# Patient Record
Sex: MALE | Race: WHITE | Employment: FULL TIME | URBAN - METROPOLITAN AREA
[De-identification: names, ages, dates, MRNs, and addresses within clinical notes are randomized per-mention and may not be internally consistent; named-entity substitution may affect disease eponyms.]

---

## 2017-01-10 RX ORDER — ALBUTEROL SULFATE 90 UG/1
2 AEROSOL, METERED RESPIRATORY (INHALATION) EVERY 4 HOURS PRN
Qty: 1 INHALER | Refills: 6 | Status: SHIPPED | OUTPATIENT
Start: 2017-01-10 | End: 2017-05-25

## 2017-01-26 DIAGNOSIS — M51.26 DISPLACEMENT OF LUMBAR INTERVERTEBRAL DISC WITHOUT MYELOPATHY: Primary | ICD-10-CM

## 2017-01-26 RX ORDER — HYDROCODONE BITARTRATE AND ACETAMINOPHEN 7.5; 325 MG/1; MG/1
TABLET ORAL
Qty: 150 TABLET | Refills: 0 | Status: SHIPPED | OUTPATIENT
Start: 2017-01-26 | End: 2017-03-02

## 2017-03-02 DIAGNOSIS — M51.26 DISPLACEMENT OF LUMBAR INTERVERTEBRAL DISC WITHOUT MYELOPATHY: Primary | ICD-10-CM

## 2017-03-02 RX ORDER — HYDROCODONE BITARTRATE AND ACETAMINOPHEN 7.5; 325 MG/1; MG/1
TABLET ORAL
Qty: 150 TABLET | Refills: 0 | Status: SHIPPED | OUTPATIENT
Start: 2017-03-02 | End: 2017-04-03

## 2017-04-03 DIAGNOSIS — M51.26 DISPLACEMENT OF LUMBAR INTERVERTEBRAL DISC WITHOUT MYELOPATHY: Primary | ICD-10-CM

## 2017-04-03 RX ORDER — HYDROCODONE BITARTRATE AND ACETAMINOPHEN 7.5; 325 MG/1; MG/1
TABLET ORAL
Qty: 150 TABLET | Refills: 0 | Status: SHIPPED | OUTPATIENT
Start: 2017-04-03 | End: 2017-05-04

## 2017-04-17 ENCOUNTER — OFFICE VISIT (OUTPATIENT)
Dept: INTERNAL MEDICINE CLINIC | Facility: CLINIC | Age: 42
End: 2017-04-17

## 2017-04-17 VITALS
TEMPERATURE: 98 F | HEART RATE: 88 BPM | WEIGHT: 295 LBS | BODY MASS INDEX: 34.13 KG/M2 | HEIGHT: 78 IN | SYSTOLIC BLOOD PRESSURE: 112 MMHG | DIASTOLIC BLOOD PRESSURE: 70 MMHG

## 2017-04-17 DIAGNOSIS — E78.00 PURE HYPERCHOLESTEROLEMIA: ICD-10-CM

## 2017-04-17 DIAGNOSIS — M51.26 DISPLACEMENT OF LUMBAR INTERVERTEBRAL DISC WITHOUT MYELOPATHY: Primary | ICD-10-CM

## 2017-04-17 DIAGNOSIS — R73.9 HYPERGLYCEMIA: ICD-10-CM

## 2017-04-17 PROCEDURE — 99212 OFFICE O/P EST SF 10 MIN: CPT | Performed by: INTERNAL MEDICINE

## 2017-04-17 PROCEDURE — 99214 OFFICE O/P EST MOD 30 MIN: CPT | Performed by: INTERNAL MEDICINE

## 2017-04-17 RX ORDER — ALBUTEROL SULFATE 90 UG/1
2 AEROSOL, METERED RESPIRATORY (INHALATION) EVERY 4 HOURS PRN
Qty: 1 INHALER | Refills: 6 | Status: SHIPPED | OUTPATIENT
Start: 2017-04-17 | End: 2021-10-28

## 2017-04-17 NOTE — PROGRESS NOTES
HPI:    Patient ID: Manav Cameron is a 39year old male. Hyperlipidemia  This is a chronic problem. The current episode started more than 1 year ago. The problem is controlled.  Recent lipid tests were reviewed and are normal. Pertinent negatives inclu EXAM:   Physical Exam   Constitutional: He is oriented to person, place, and time. He appears well-developed and well-nourished. No distress.    HENT:   Right Ear: External ear normal.   Left Ear: External ear normal.   Nose: Nose normal.   Mouth/Throat: Or 4 (four) hours as needed for Wheezing.            Imaging & Referrals:  None       #[id number]

## 2017-04-22 ENCOUNTER — HOSPITAL (OUTPATIENT)
Dept: OTHER | Age: 42
End: 2017-04-22
Attending: EMERGENCY MEDICINE

## 2017-05-04 DIAGNOSIS — M51.26 DISPLACEMENT OF LUMBAR INTERVERTEBRAL DISC WITHOUT MYELOPATHY: Primary | ICD-10-CM

## 2017-05-04 RX ORDER — MONTELUKAST SODIUM 10 MG/1
10 TABLET ORAL NIGHTLY
Qty: 30 TABLET | Refills: 1 | Status: SHIPPED | OUTPATIENT
Start: 2017-05-04 | End: 2017-07-02

## 2017-05-04 RX ORDER — MONTELUKAST SODIUM 10 MG/1
10 TABLET ORAL NIGHTLY
Qty: 90 TABLET | Refills: 3 | Status: SHIPPED | OUTPATIENT
Start: 2017-05-04 | End: 2019-10-15

## 2017-05-04 RX ORDER — HYDROCODONE BITARTRATE AND ACETAMINOPHEN 7.5; 325 MG/1; MG/1
TABLET ORAL
Qty: 150 TABLET | Refills: 0 | Status: SHIPPED | OUTPATIENT
Start: 2017-05-04 | End: 2017-06-01

## 2017-06-01 DIAGNOSIS — M51.26 DISPLACEMENT OF LUMBAR INTERVERTEBRAL DISC WITHOUT MYELOPATHY: Primary | ICD-10-CM

## 2017-06-01 RX ORDER — HYDROCODONE BITARTRATE AND ACETAMINOPHEN 7.5; 325 MG/1; MG/1
TABLET ORAL
Qty: 150 TABLET | Refills: 0 | Status: SHIPPED | OUTPATIENT
Start: 2017-06-01 | End: 2017-06-26

## 2017-06-26 DIAGNOSIS — M51.26 DISPLACEMENT OF LUMBAR INTERVERTEBRAL DISC WITHOUT MYELOPATHY: ICD-10-CM

## 2017-06-26 RX ORDER — HYDROCODONE BITARTRATE AND ACETAMINOPHEN 7.5; 325 MG/1; MG/1
TABLET ORAL
Qty: 150 TABLET | Refills: 0 | Status: SHIPPED | OUTPATIENT
Start: 2017-06-28 | End: 2017-07-27

## 2017-07-06 RX ORDER — MONTELUKAST SODIUM 10 MG/1
TABLET ORAL
Qty: 30 TABLET | Refills: 0 | Status: SHIPPED | OUTPATIENT
Start: 2017-07-06 | End: 2017-08-10

## 2017-07-27 DIAGNOSIS — M51.26 DISPLACEMENT OF LUMBAR INTERVERTEBRAL DISC WITHOUT MYELOPATHY: ICD-10-CM

## 2017-07-27 RX ORDER — HYDROCODONE BITARTRATE AND ACETAMINOPHEN 7.5; 325 MG/1; MG/1
TABLET ORAL
Qty: 150 TABLET | Refills: 0 | Status: SHIPPED | OUTPATIENT
Start: 2017-07-27 | End: 2017-08-24

## 2017-08-04 ENCOUNTER — TELEPHONE (OUTPATIENT)
Dept: INTERNAL MEDICINE CLINIC | Facility: CLINIC | Age: 42
End: 2017-08-04

## 2017-08-04 RX ORDER — LEVOFLOXACIN 500 MG/1
500 TABLET, FILM COATED ORAL DAILY
Qty: 10 TABLET | Refills: 0 | Status: SHIPPED | OUTPATIENT
Start: 2017-08-04 | End: 2019-10-24

## 2017-08-10 RX ORDER — MONTELUKAST SODIUM 10 MG/1
TABLET ORAL
Qty: 30 TABLET | Refills: 11 | Status: SHIPPED | OUTPATIENT
Start: 2017-08-10 | End: 2017-10-09

## 2017-08-24 DIAGNOSIS — M51.26 DISPLACEMENT OF LUMBAR INTERVERTEBRAL DISC WITHOUT MYELOPATHY: ICD-10-CM

## 2017-08-24 RX ORDER — HYDROCODONE BITARTRATE AND ACETAMINOPHEN 7.5; 325 MG/1; MG/1
TABLET ORAL
Qty: 150 TABLET | Refills: 0 | Status: SHIPPED | OUTPATIENT
Start: 2017-08-24 | End: 2017-09-21

## 2017-09-21 DIAGNOSIS — M51.26 DISPLACEMENT OF LUMBAR INTERVERTEBRAL DISC WITHOUT MYELOPATHY: ICD-10-CM

## 2017-09-22 RX ORDER — HYDROCODONE BITARTRATE AND ACETAMINOPHEN 7.5; 325 MG/1; MG/1
TABLET ORAL
Qty: 150 TABLET | Refills: 0 | Status: SHIPPED | OUTPATIENT
Start: 2017-09-25 | End: 2017-10-20

## 2017-10-09 ENCOUNTER — OFFICE VISIT (OUTPATIENT)
Dept: INTERNAL MEDICINE CLINIC | Facility: CLINIC | Age: 42
End: 2017-10-09

## 2017-10-09 VITALS
WEIGHT: 299 LBS | HEIGHT: 78 IN | TEMPERATURE: 98 F | HEART RATE: 80 BPM | BODY MASS INDEX: 34.59 KG/M2 | SYSTOLIC BLOOD PRESSURE: 110 MMHG | DIASTOLIC BLOOD PRESSURE: 76 MMHG

## 2017-10-09 DIAGNOSIS — R73.9 HYPERGLYCEMIA: ICD-10-CM

## 2017-10-09 DIAGNOSIS — Z00.00 ANNUAL PHYSICAL EXAM: Primary | ICD-10-CM

## 2017-10-09 DIAGNOSIS — E78.00 PURE HYPERCHOLESTEROLEMIA: ICD-10-CM

## 2017-10-09 DIAGNOSIS — Z72.0 TOBACCO ABUSE: ICD-10-CM

## 2017-10-09 PROCEDURE — 99396 PREV VISIT EST AGE 40-64: CPT | Performed by: INTERNAL MEDICINE

## 2017-10-09 PROCEDURE — 36415 COLL VENOUS BLD VENIPUNCTURE: CPT | Performed by: INTERNAL MEDICINE

## 2017-10-09 NOTE — PROGRESS NOTES
HPI:    Patient ID: Ariana Butler is a 43year old male. Patient is stable, he has been trying to exercise more and watch his diet. Nicotine Dependence   This is a chronic problem. The current episode started more than 1 year ago.  The problem oc 4 (four) hours as needed for Wheezing.  Disp: 1 Inhaler Rfl: 6   triamcinolone acetonide 0.1 % External Ointment APPLY BID PRN TO AFFECTED AREA Disp: 30 g Rfl: 3   naproxen (NAPROSYN) 500 MG Oral Tab TAKE 1 TABLET TWICE A DAY WITH FOOD Disp: 180 tablet Rfl: hypercholesterolemia  Hyperglycemia  Tobacco abuse      Orders Placed This Encounter      CBC [6399] [Q]      COMP METABOLIC PANEL [84328] [Q]      HGB A1C [496] [Q]      LIPID PANEL [7600] [Q]      TSH [899] [Q]    Meds This Visit:  No prescriptions reque

## 2017-10-20 DIAGNOSIS — M51.26 DISPLACEMENT OF LUMBAR INTERVERTEBRAL DISC WITHOUT MYELOPATHY: ICD-10-CM

## 2017-10-20 RX ORDER — HYDROCODONE BITARTRATE AND ACETAMINOPHEN 7.5; 325 MG/1; MG/1
TABLET ORAL
Qty: 150 TABLET | Refills: 0 | Status: SHIPPED | OUTPATIENT
Start: 2017-10-23 | End: 2017-11-16

## 2017-11-16 DIAGNOSIS — M51.26 DISPLACEMENT OF LUMBAR INTERVERTEBRAL DISC WITHOUT MYELOPATHY: ICD-10-CM

## 2017-11-21 RX ORDER — HYDROCODONE BITARTRATE AND ACETAMINOPHEN 7.5; 325 MG/1; MG/1
TABLET ORAL
Qty: 150 TABLET | Refills: 0 | Status: SHIPPED | OUTPATIENT
Start: 2017-11-21 | End: 2017-12-18

## 2017-12-18 DIAGNOSIS — M51.26 DISPLACEMENT OF LUMBAR INTERVERTEBRAL DISC WITHOUT MYELOPATHY: ICD-10-CM

## 2017-12-18 RX ORDER — HYDROCODONE BITARTRATE AND ACETAMINOPHEN 7.5; 325 MG/1; MG/1
TABLET ORAL
Qty: 150 TABLET | Refills: 0 | Status: SHIPPED | OUTPATIENT
Start: 2017-12-19 | End: 2018-01-16

## 2018-01-16 DIAGNOSIS — M51.26 DISPLACEMENT OF LUMBAR INTERVERTEBRAL DISC WITHOUT MYELOPATHY: ICD-10-CM

## 2018-01-16 RX ORDER — HYDROCODONE BITARTRATE AND ACETAMINOPHEN 7.5; 325 MG/1; MG/1
TABLET ORAL
Qty: 150 TABLET | Refills: 0 | Status: SHIPPED | OUTPATIENT
Start: 2018-01-16 | End: 2018-02-08

## 2018-02-08 DIAGNOSIS — M51.26 DISPLACEMENT OF LUMBAR INTERVERTEBRAL DISC WITHOUT MYELOPATHY: ICD-10-CM

## 2018-02-08 RX ORDER — HYDROCODONE BITARTRATE AND ACETAMINOPHEN 7.5; 325 MG/1; MG/1
TABLET ORAL
Qty: 150 TABLET | Refills: 0 | Status: SHIPPED | OUTPATIENT
Start: 2018-02-14 | End: 2018-03-08

## 2018-03-08 ENCOUNTER — TELEPHONE (OUTPATIENT)
Dept: INTERNAL MEDICINE CLINIC | Facility: CLINIC | Age: 43
End: 2018-03-08

## 2018-03-08 DIAGNOSIS — M51.26 DISPLACEMENT OF LUMBAR INTERVERTEBRAL DISC WITHOUT MYELOPATHY: ICD-10-CM

## 2018-03-08 RX ORDER — HYDROCODONE BITARTRATE AND ACETAMINOPHEN 7.5; 325 MG/1; MG/1
TABLET ORAL
Qty: 150 TABLET | Refills: 0 | Status: SHIPPED | OUTPATIENT
Start: 2018-03-15 | End: 2018-04-09

## 2018-04-09 ENCOUNTER — OFFICE VISIT (OUTPATIENT)
Dept: INTERNAL MEDICINE CLINIC | Facility: CLINIC | Age: 43
End: 2018-04-09

## 2018-04-09 VITALS
DIASTOLIC BLOOD PRESSURE: 80 MMHG | WEIGHT: 302 LBS | BODY MASS INDEX: 34.94 KG/M2 | HEART RATE: 92 BPM | TEMPERATURE: 98 F | HEIGHT: 78 IN | SYSTOLIC BLOOD PRESSURE: 130 MMHG

## 2018-04-09 DIAGNOSIS — R73.9 HYPERGLYCEMIA: ICD-10-CM

## 2018-04-09 DIAGNOSIS — E78.00 PURE HYPERCHOLESTEROLEMIA: Primary | ICD-10-CM

## 2018-04-09 DIAGNOSIS — I10 ESSENTIAL HYPERTENSION, BENIGN: ICD-10-CM

## 2018-04-09 DIAGNOSIS — M51.26 DISPLACEMENT OF LUMBAR INTERVERTEBRAL DISC WITHOUT MYELOPATHY: ICD-10-CM

## 2018-04-09 PROCEDURE — 99212 OFFICE O/P EST SF 10 MIN: CPT | Performed by: INTERNAL MEDICINE

## 2018-04-09 PROCEDURE — 99214 OFFICE O/P EST MOD 30 MIN: CPT | Performed by: INTERNAL MEDICINE

## 2018-04-09 RX ORDER — HYDROCODONE BITARTRATE AND ACETAMINOPHEN 7.5; 325 MG/1; MG/1
TABLET ORAL
Qty: 150 TABLET | Refills: 0 | Status: SHIPPED | OUTPATIENT
Start: 2018-04-09 | End: 2018-05-03

## 2018-04-09 NOTE — PROGRESS NOTES
HPI:    Patient ID: Jae Holland is a 43year old male. Hypertension   This is a chronic problem. The current episode started more than 1 year ago. The problem is unchanged. The problem is controlled.  Pertinent negatives include no anxiety, blurred v Montelukast Sodium 10 MG Oral Tab Take 1 tablet (10 mg total) by mouth nightly.  Disp: 90 tablet Rfl: 3   Albuterol Sulfate HFA (PROAIR HFA) 108 (90 Base) MCG/ACT Inhalation Aero Soln Inhale 2 puffs into the lungs every 4 (four) hours as needed for [employer] pallor. Psychiatric: He has a normal mood and affect. His behavior is normal. Judgment and thought content normal.   Vitals reviewed.              ASSESSMENT/PLAN:   Essential hypertension, benign  bp Is controlled, diet and exercise , stop smoking all to

## 2018-04-09 NOTE — ASSESSMENT & PLAN NOTE
Discussed re evaluation  With neuro surgery , and updated MRI,   Patient will notify when he is ready , claustrophobic also.

## 2018-05-03 ENCOUNTER — TELEPHONE (OUTPATIENT)
Dept: FAMILY MEDICINE CLINIC | Facility: CLINIC | Age: 43
End: 2018-05-03

## 2018-05-03 DIAGNOSIS — M51.26 DISPLACEMENT OF LUMBAR INTERVERTEBRAL DISC WITHOUT MYELOPATHY: ICD-10-CM

## 2018-05-03 RX ORDER — HYDROCODONE BITARTRATE AND ACETAMINOPHEN 7.5; 325 MG/1; MG/1
TABLET ORAL
Qty: 150 TABLET | Refills: 0 | Status: SHIPPED | OUTPATIENT
Start: 2018-05-08 | End: 2018-06-01

## 2018-06-01 ENCOUNTER — TELEPHONE (OUTPATIENT)
Dept: FAMILY MEDICINE CLINIC | Facility: CLINIC | Age: 43
End: 2018-06-01

## 2018-06-01 DIAGNOSIS — M51.26 DISPLACEMENT OF LUMBAR INTERVERTEBRAL DISC WITHOUT MYELOPATHY: ICD-10-CM

## 2018-06-01 RX ORDER — HYDROCODONE BITARTRATE AND ACETAMINOPHEN 7.5; 325 MG/1; MG/1
TABLET ORAL
Qty: 150 TABLET | Refills: 0 | Status: SHIPPED | OUTPATIENT
Start: 2018-06-07 | End: 2018-06-28

## 2018-06-28 ENCOUNTER — TELEPHONE (OUTPATIENT)
Dept: FAMILY MEDICINE CLINIC | Facility: CLINIC | Age: 43
End: 2018-06-28

## 2018-06-28 DIAGNOSIS — M51.26 DISPLACEMENT OF LUMBAR INTERVERTEBRAL DISC WITHOUT MYELOPATHY: ICD-10-CM

## 2018-06-28 RX ORDER — HYDROCODONE BITARTRATE AND ACETAMINOPHEN 7.5; 325 MG/1; MG/1
TABLET ORAL
Qty: 150 TABLET | Refills: 0 | Status: SHIPPED | OUTPATIENT
Start: 2018-07-06 | End: 2018-08-02

## 2018-07-02 ENCOUNTER — OFFICE VISIT (OUTPATIENT)
Dept: INTERNAL MEDICINE CLINIC | Facility: CLINIC | Age: 43
End: 2018-07-02

## 2018-07-02 VITALS
BODY MASS INDEX: 35.17 KG/M2 | SYSTOLIC BLOOD PRESSURE: 130 MMHG | DIASTOLIC BLOOD PRESSURE: 80 MMHG | HEART RATE: 80 BPM | WEIGHT: 304 LBS | TEMPERATURE: 99 F | HEIGHT: 78 IN

## 2018-07-02 DIAGNOSIS — M10.072 ACUTE IDIOPATHIC GOUT OF LEFT ANKLE: Primary | ICD-10-CM

## 2018-07-02 PROCEDURE — 99214 OFFICE O/P EST MOD 30 MIN: CPT | Performed by: INTERNAL MEDICINE

## 2018-07-02 PROCEDURE — 99212 OFFICE O/P EST SF 10 MIN: CPT | Performed by: INTERNAL MEDICINE

## 2018-07-02 RX ORDER — INDOMETHACIN 50 MG/1
50 CAPSULE ORAL 2 TIMES DAILY WITH MEALS
Qty: 30 CAPSULE | Refills: 1 | Status: SHIPPED | OUTPATIENT
Start: 2018-07-02 | End: 2021-10-28 | Stop reason: ALTCHOICE

## 2018-07-02 NOTE — PROGRESS NOTES
HPI:    Patient ID: Gabriele Goel is a 43year old male. Pain   This is a new problem. The current episode started in the past 7 days. The problem has been unchanged. Associated symptoms include arthralgias and myalgias.  Pertinent negatives include no triamcinolone acetonide 0.1 % External Ointment APPLY BID PRN TO AFFECTED AREA Disp: 30 g Rfl: 3     Allergies:  Penicillins             ITCHING, FEVER   PHYSICAL EXAM:   Physical Exam   Constitutional: He is oriented to person, place, and time.  He appears indomethacin 50 MG Oral Cap 30 capsule 1      Sig: Take 1 capsule (50 mg total) by mouth 2 (two) times daily with meals.            Imaging & Referrals:  None       KF#[id number]

## 2018-07-03 ENCOUNTER — TELEPHONE (OUTPATIENT)
Dept: INTERNAL MEDICINE CLINIC | Facility: CLINIC | Age: 43
End: 2018-07-03

## 2018-07-03 LAB — URIC ACID: 8.8 MG/DL (ref 4–8)

## 2018-07-03 RX ORDER — ALLOPURINOL 100 MG/1
100 TABLET ORAL 2 TIMES DAILY
Qty: 60 TABLET | Refills: 0 | Status: SHIPPED | OUTPATIENT
Start: 2018-07-03 | End: 2020-03-31

## 2018-08-02 ENCOUNTER — TELEPHONE (OUTPATIENT)
Dept: FAMILY MEDICINE CLINIC | Facility: CLINIC | Age: 43
End: 2018-08-02

## 2018-08-02 DIAGNOSIS — M51.26 DISPLACEMENT OF LUMBAR INTERVERTEBRAL DISC WITHOUT MYELOPATHY: ICD-10-CM

## 2018-08-02 RX ORDER — HYDROCODONE BITARTRATE AND ACETAMINOPHEN 7.5; 325 MG/1; MG/1
TABLET ORAL
Qty: 150 TABLET | Refills: 0 | Status: SHIPPED | OUTPATIENT
Start: 2018-08-05 | End: 2018-08-30

## 2018-08-04 RX ORDER — MONTELUKAST SODIUM 10 MG/1
TABLET ORAL
Qty: 30 TABLET | Refills: 11 | Status: SHIPPED | OUTPATIENT
Start: 2018-08-04 | End: 2019-07-28

## 2018-08-30 ENCOUNTER — TELEPHONE (OUTPATIENT)
Dept: FAMILY MEDICINE CLINIC | Facility: CLINIC | Age: 43
End: 2018-08-30

## 2018-08-30 DIAGNOSIS — M51.26 DISPLACEMENT OF LUMBAR INTERVERTEBRAL DISC WITHOUT MYELOPATHY: ICD-10-CM

## 2018-08-30 RX ORDER — HYDROCODONE BITARTRATE AND ACETAMINOPHEN 7.5; 325 MG/1; MG/1
TABLET ORAL
Qty: 150 TABLET | Refills: 0 | Status: SHIPPED | OUTPATIENT
Start: 2018-09-04 | End: 2018-10-04

## 2018-10-04 ENCOUNTER — TELEPHONE (OUTPATIENT)
Dept: FAMILY MEDICINE CLINIC | Facility: CLINIC | Age: 43
End: 2018-10-04

## 2018-10-04 DIAGNOSIS — M51.26 DISPLACEMENT OF LUMBAR INTERVERTEBRAL DISC WITHOUT MYELOPATHY: ICD-10-CM

## 2018-10-04 RX ORDER — HYDROCODONE BITARTRATE AND ACETAMINOPHEN 7.5; 325 MG/1; MG/1
TABLET ORAL
Qty: 150 TABLET | Refills: 0 | Status: SHIPPED | OUTPATIENT
Start: 2018-10-04 | End: 2018-11-01

## 2018-11-01 ENCOUNTER — TELEPHONE (OUTPATIENT)
Dept: FAMILY MEDICINE CLINIC | Facility: CLINIC | Age: 43
End: 2018-11-01

## 2018-11-01 DIAGNOSIS — M51.26 DISPLACEMENT OF LUMBAR INTERVERTEBRAL DISC WITHOUT MYELOPATHY: ICD-10-CM

## 2018-11-01 RX ORDER — HYDROCODONE BITARTRATE AND ACETAMINOPHEN 7.5; 325 MG/1; MG/1
TABLET ORAL
Qty: 150 TABLET | Refills: 0 | Status: SHIPPED | OUTPATIENT
Start: 2018-11-03 | End: 2018-11-30

## 2018-11-02 ENCOUNTER — TELEPHONE (OUTPATIENT)
Dept: FAMILY MEDICINE CLINIC | Facility: CLINIC | Age: 43
End: 2018-11-02

## 2018-11-30 ENCOUNTER — TELEPHONE (OUTPATIENT)
Dept: FAMILY MEDICINE CLINIC | Facility: CLINIC | Age: 43
End: 2018-11-30

## 2018-11-30 DIAGNOSIS — M51.26 DISPLACEMENT OF LUMBAR INTERVERTEBRAL DISC WITHOUT MYELOPATHY: ICD-10-CM

## 2018-11-30 RX ORDER — HYDROCODONE BITARTRATE AND ACETAMINOPHEN 7.5; 325 MG/1; MG/1
TABLET ORAL
Qty: 150 TABLET | Refills: 0 | Status: SHIPPED | OUTPATIENT
Start: 2018-12-03 | End: 2019-01-03

## 2019-01-03 DIAGNOSIS — M51.26 DISPLACEMENT OF LUMBAR INTERVERTEBRAL DISC WITHOUT MYELOPATHY: ICD-10-CM

## 2019-01-03 RX ORDER — HYDROCODONE BITARTRATE AND ACETAMINOPHEN 7.5; 325 MG/1; MG/1
TABLET ORAL
Qty: 150 TABLET | Refills: 0 | Status: SHIPPED | OUTPATIENT
Start: 2019-01-03 | End: 2019-01-31

## 2019-01-08 ENCOUNTER — TELEPHONE (OUTPATIENT)
Dept: FAMILY MEDICINE CLINIC | Facility: CLINIC | Age: 44
End: 2019-01-08

## 2019-01-31 ENCOUNTER — TELEPHONE (OUTPATIENT)
Dept: FAMILY MEDICINE CLINIC | Facility: CLINIC | Age: 44
End: 2019-01-31

## 2019-01-31 DIAGNOSIS — M51.26 DISPLACEMENT OF LUMBAR INTERVERTEBRAL DISC WITHOUT MYELOPATHY: ICD-10-CM

## 2019-01-31 RX ORDER — HYDROCODONE BITARTRATE AND ACETAMINOPHEN 7.5; 325 MG/1; MG/1
TABLET ORAL
Qty: 150 TABLET | Refills: 0 | Status: SHIPPED | OUTPATIENT
Start: 2019-02-02 | End: 2019-02-28

## 2019-02-28 ENCOUNTER — TELEPHONE (OUTPATIENT)
Dept: FAMILY MEDICINE CLINIC | Facility: CLINIC | Age: 44
End: 2019-02-28

## 2019-02-28 DIAGNOSIS — M51.26 DISPLACEMENT OF LUMBAR INTERVERTEBRAL DISC WITHOUT MYELOPATHY: ICD-10-CM

## 2019-02-28 RX ORDER — HYDROCODONE BITARTRATE AND ACETAMINOPHEN 7.5; 325 MG/1; MG/1
TABLET ORAL
Qty: 150 TABLET | Refills: 0 | Status: SHIPPED | OUTPATIENT
Start: 2019-03-01 | End: 2019-04-01

## 2019-04-01 ENCOUNTER — TELEPHONE (OUTPATIENT)
Dept: FAMILY MEDICINE CLINIC | Facility: CLINIC | Age: 44
End: 2019-04-01

## 2019-04-01 DIAGNOSIS — M51.26 DISPLACEMENT OF LUMBAR INTERVERTEBRAL DISC WITHOUT MYELOPATHY: ICD-10-CM

## 2019-04-01 RX ORDER — HYDROCODONE BITARTRATE AND ACETAMINOPHEN 7.5; 325 MG/1; MG/1
TABLET ORAL
Qty: 150 TABLET | Refills: 0 | Status: SHIPPED | OUTPATIENT
Start: 2019-04-01 | End: 2019-04-25

## 2019-04-25 ENCOUNTER — TELEPHONE (OUTPATIENT)
Dept: FAMILY MEDICINE CLINIC | Facility: CLINIC | Age: 44
End: 2019-04-25

## 2019-04-25 DIAGNOSIS — M51.26 DISPLACEMENT OF LUMBAR INTERVERTEBRAL DISC WITHOUT MYELOPATHY: ICD-10-CM

## 2019-04-25 RX ORDER — HYDROCODONE BITARTRATE AND ACETAMINOPHEN 7.5; 325 MG/1; MG/1
TABLET ORAL
Qty: 150 TABLET | Refills: 0 | Status: SHIPPED | OUTPATIENT
Start: 2019-04-30 | End: 2019-05-28

## 2019-05-28 ENCOUNTER — TELEPHONE (OUTPATIENT)
Dept: FAMILY MEDICINE CLINIC | Facility: CLINIC | Age: 44
End: 2019-05-28

## 2019-05-28 DIAGNOSIS — M51.26 DISPLACEMENT OF LUMBAR INTERVERTEBRAL DISC WITHOUT MYELOPATHY: ICD-10-CM

## 2019-05-30 RX ORDER — HYDROCODONE BITARTRATE AND ACETAMINOPHEN 7.5; 325 MG/1; MG/1
TABLET ORAL
Qty: 150 TABLET | Refills: 0 | Status: SHIPPED | OUTPATIENT
Start: 2019-05-30 | End: 2019-06-25

## 2019-06-25 ENCOUNTER — TELEPHONE (OUTPATIENT)
Dept: FAMILY MEDICINE CLINIC | Facility: CLINIC | Age: 44
End: 2019-06-25

## 2019-06-25 DIAGNOSIS — M51.26 DISPLACEMENT OF LUMBAR INTERVERTEBRAL DISC WITHOUT MYELOPATHY: ICD-10-CM

## 2019-06-27 RX ORDER — HYDROCODONE BITARTRATE AND ACETAMINOPHEN 7.5; 325 MG/1; MG/1
TABLET ORAL
Qty: 150 TABLET | Refills: 0 | Status: SHIPPED | OUTPATIENT
Start: 2019-06-29 | End: 2019-07-11

## 2019-07-11 ENCOUNTER — TELEPHONE (OUTPATIENT)
Dept: FAMILY MEDICINE CLINIC | Facility: CLINIC | Age: 44
End: 2019-07-11

## 2019-07-11 DIAGNOSIS — M51.26 DISPLACEMENT OF LUMBAR INTERVERTEBRAL DISC WITHOUT MYELOPATHY: ICD-10-CM

## 2019-07-11 RX ORDER — HYDROCODONE BITARTRATE AND ACETAMINOPHEN 7.5; 325 MG/1; MG/1
TABLET ORAL
Qty: 150 TABLET | Refills: 0 | Status: SHIPPED | OUTPATIENT
Start: 2019-07-28 | End: 2019-08-08

## 2019-07-11 NOTE — TELEPHONE ENCOUNTER
Is asking if he could have his Norco, early, not due till end of month, so Gildardo can get for him ? ?

## 2019-07-28 RX ORDER — MONTELUKAST SODIUM 10 MG/1
TABLET ORAL
Qty: 30 TABLET | Refills: 5 | Status: SHIPPED | OUTPATIENT
Start: 2019-07-28 | End: 2020-02-03

## 2019-08-08 ENCOUNTER — TELEPHONE (OUTPATIENT)
Dept: FAMILY MEDICINE CLINIC | Facility: CLINIC | Age: 44
End: 2019-08-08

## 2019-08-08 DIAGNOSIS — M51.26 DISPLACEMENT OF LUMBAR INTERVERTEBRAL DISC WITHOUT MYELOPATHY: ICD-10-CM

## 2019-08-08 RX ORDER — HYDROCODONE BITARTRATE AND ACETAMINOPHEN 7.5; 325 MG/1; MG/1
TABLET ORAL
Qty: 150 TABLET | Refills: 0 | Status: SHIPPED | OUTPATIENT
Start: 2019-08-27 | End: 2019-09-05

## 2019-09-05 ENCOUNTER — TELEPHONE (OUTPATIENT)
Dept: INTERNAL MEDICINE CLINIC | Facility: CLINIC | Age: 44
End: 2019-09-05

## 2019-09-05 DIAGNOSIS — M51.26 DISPLACEMENT OF LUMBAR INTERVERTEBRAL DISC WITHOUT MYELOPATHY: ICD-10-CM

## 2019-09-05 RX ORDER — HYDROCODONE BITARTRATE AND ACETAMINOPHEN 7.5; 325 MG/1; MG/1
TABLET ORAL
Qty: 150 TABLET | Refills: 0 | Status: SHIPPED | OUTPATIENT
Start: 2019-09-26 | End: 2019-10-24

## 2019-10-03 ENCOUNTER — TELEPHONE (OUTPATIENT)
Dept: FAMILY MEDICINE CLINIC | Facility: CLINIC | Age: 44
End: 2019-10-03

## 2019-10-03 DIAGNOSIS — M51.26 DISPLACEMENT OF LUMBAR INTERVERTEBRAL DISC WITHOUT MYELOPATHY: ICD-10-CM

## 2019-10-03 NOTE — TELEPHONE ENCOUNTER
Patients refill is due 10/25/19 and can be sent to the pharmacy by the doctor.   He will call back when it is time for refill

## 2019-10-15 ENCOUNTER — LAB ENCOUNTER (OUTPATIENT)
Dept: LAB | Age: 44
End: 2019-10-15
Attending: INTERNAL MEDICINE
Payer: COMMERCIAL

## 2019-10-15 ENCOUNTER — OFFICE VISIT (OUTPATIENT)
Dept: INTERNAL MEDICINE CLINIC | Facility: CLINIC | Age: 44
End: 2019-10-15
Payer: COMMERCIAL

## 2019-10-15 VITALS
BODY MASS INDEX: 34.83 KG/M2 | HEART RATE: 88 BPM | HEIGHT: 78 IN | DIASTOLIC BLOOD PRESSURE: 80 MMHG | TEMPERATURE: 99 F | WEIGHT: 301 LBS | SYSTOLIC BLOOD PRESSURE: 124 MMHG

## 2019-10-15 DIAGNOSIS — Z00.00 ANNUAL PHYSICAL EXAM: Primary | ICD-10-CM

## 2019-10-15 DIAGNOSIS — M48.062 SPINAL STENOSIS OF LUMBAR REGION WITH NEUROGENIC CLAUDICATION: ICD-10-CM

## 2019-10-15 DIAGNOSIS — I10 ESSENTIAL HYPERTENSION, BENIGN: ICD-10-CM

## 2019-10-15 PROCEDURE — 36415 COLL VENOUS BLD VENIPUNCTURE: CPT

## 2019-10-15 PROCEDURE — 99396 PREV VISIT EST AGE 40-64: CPT | Performed by: INTERNAL MEDICINE

## 2019-10-15 RX ORDER — ALPRAZOLAM 0.5 MG/1
TABLET ORAL
Qty: 4 TABLET | Refills: 0 | Status: SHIPPED | OUTPATIENT
Start: 2019-10-15 | End: 2021-07-01

## 2019-10-15 NOTE — ASSESSMENT & PLAN NOTE
MRI of the lumbar spine , might need referral to neurosurgery , physical therapy and epidural steroids did not work in the past, patient has been on Orren Rides for over 10 years.   His symptoms are worsening now with bilateral leg pain with walking which improve

## 2019-10-15 NOTE — PROGRESS NOTES
HPI:    Patient ID: Scott Payan is a 40year old male. HPIpatient   Hypertension   This is a chronic problem. The current episode started more than 1 year ago. The problem is unchanged. The problem is controlled.  Pertinent negatives include no anxie MG Oral Tab, Take xanax 1/2 hour prior to MRI , may repeat times one., Disp: 4 tablet, Rfl: 0  HYDROcodone-acetaminophen 7.5-325 MG Oral Tab, Take one every 6 to 8 hours as needed for pain not to exceed 5 tablets in 24 hours, Disp: 150 tablet, Rfl: 0  HALIMA No murmur heard. Pulmonary/Chest: Effort normal and breath sounds normal. No respiratory distress. He has no wheezes. He has no rales. He exhibits no tenderness. Abdominal: Soft. Bowel sounds are normal. He exhibits no distension and no mass.  There is

## 2019-10-21 ENCOUNTER — TELEPHONE (OUTPATIENT)
Dept: FAMILY MEDICINE CLINIC | Facility: CLINIC | Age: 44
End: 2019-10-21

## 2019-10-21 RX ORDER — VALACYCLOVIR HYDROCHLORIDE 1 G/1
1 TABLET, FILM COATED ORAL EVERY 12 HOURS SCHEDULED
Qty: 4 TABLET | Refills: 3 | Status: SHIPPED | OUTPATIENT
Start: 2019-10-21 | End: 2019-10-28

## 2019-10-24 ENCOUNTER — TELEPHONE (OUTPATIENT)
Dept: FAMILY MEDICINE CLINIC | Facility: CLINIC | Age: 44
End: 2019-10-24

## 2019-10-24 DIAGNOSIS — M51.26 DISPLACEMENT OF LUMBAR INTERVERTEBRAL DISC WITHOUT MYELOPATHY: ICD-10-CM

## 2019-10-24 RX ORDER — LEVOFLOXACIN 500 MG/1
500 TABLET, FILM COATED ORAL DAILY
Qty: 10 TABLET | Refills: 0 | Status: SHIPPED | OUTPATIENT
Start: 2019-10-24 | End: 2019-11-03

## 2019-10-24 RX ORDER — HYDROCODONE BITARTRATE AND ACETAMINOPHEN 7.5; 325 MG/1; MG/1
TABLET ORAL
Qty: 150 TABLET | Refills: 0 | Status: SHIPPED | OUTPATIENT
Start: 2019-10-25 | End: 2019-11-25

## 2019-11-25 ENCOUNTER — TELEPHONE (OUTPATIENT)
Dept: FAMILY MEDICINE CLINIC | Facility: CLINIC | Age: 44
End: 2019-11-25

## 2019-11-25 DIAGNOSIS — M51.26 DISPLACEMENT OF LUMBAR INTERVERTEBRAL DISC WITHOUT MYELOPATHY: ICD-10-CM

## 2019-11-25 RX ORDER — HYDROCODONE BITARTRATE AND ACETAMINOPHEN 7.5; 325 MG/1; MG/1
TABLET ORAL
Qty: 150 TABLET | Refills: 0 | Status: SHIPPED | OUTPATIENT
Start: 2019-11-25 | End: 2020-01-02

## 2019-11-25 NOTE — TELEPHONE ENCOUNTER
Refill for Norco----is asking for a two month supply, cause he wont have insurance, I told him not sure on that, we will have to get back to him. ..

## 2020-01-02 ENCOUNTER — TELEPHONE (OUTPATIENT)
Dept: FAMILY MEDICINE CLINIC | Facility: CLINIC | Age: 45
End: 2020-01-02

## 2020-01-02 DIAGNOSIS — M51.26 DISPLACEMENT OF LUMBAR INTERVERTEBRAL DISC WITHOUT MYELOPATHY: ICD-10-CM

## 2020-01-02 RX ORDER — HYDROCODONE BITARTRATE AND ACETAMINOPHEN 7.5; 325 MG/1; MG/1
TABLET ORAL
Qty: 150 TABLET | Refills: 0 | Status: SHIPPED | OUTPATIENT
Start: 2020-01-02 | End: 2020-01-28

## 2020-01-06 ENCOUNTER — TELEPHONE (OUTPATIENT)
Dept: FAMILY MEDICINE CLINIC | Facility: CLINIC | Age: 45
End: 2020-01-06

## 2020-01-06 NOTE — TELEPHONE ENCOUNTER
Prior authorization for Hydrocodone-Acetaminophen oral tab completed w/ [employer]  on cover my meds Key: [id number], turn around time 3-5 days.

## 2020-01-06 NOTE — TELEPHONE ENCOUNTER
Go to [employer]  Key:[id number]  Current Outpatient Medications:   •  HYDROcodone-acetaminophen 7.5-325 MG Oral Tab, Take one every 6 to 8 hours as needed for pain not to exceed 5 tablets in 24 hours, Disp: 150 tablet, Rfl: 0

## 2020-01-09 NOTE — TELEPHONE ENCOUNTER
Outcome   Approvedon January 6   CaseId:[id number];Status:Approved; Review Type:Prior Auth; Coverage Start Date: 01/06/2020; Coverage End Date: 01/05/2021;   Drug HYDROcodone-Acetaminophen 7.5-325MG tablets    VM has been left on pharmacy VM.

## 2020-01-19 ENCOUNTER — HOSPITAL (OUTPATIENT)
Dept: OTHER | Age: 45
End: 2020-01-19
Attending: INTERNAL MEDICINE

## 2020-01-19 ENCOUNTER — HOSPITAL (OUTPATIENT)
Dept: OTHER | Age: 45
End: 2020-01-19
Attending: HOSPITALIST

## 2020-01-19 LAB
A/G RATIO_: 1.6
ABS LYMPH: 2.2 K/CUMM (ref 1–3.5)
ABS MONO: 1 K/CUMM (ref 0.1–0.8)
ABS NEUTRO: 9 K/CUMM (ref 2–8)
ALBUMIN: 3.6 G/DL (ref 3.5–5)
ALK PHOS: 74 UNIT/L (ref 50–124)
ALT/GPT: 10 UNIT/L (ref 0–55)
ANION GAP SERPL CALC-SCNC: 11 MEQ/L (ref 10–20)
APTT PPP: 31 SECONDS (ref 22–30)
AST/GOT: 15 UNIT/L (ref 5–34)
BASOPHIL: 1 % (ref 0–1)
BILI TOTAL: 0.5 MG/DL (ref 0.2–1)
BUN SERPL-MCNC: 13 MG/DL (ref 6–20)
CALCIUM: 7.8 MG/DL (ref 8.4–10.2)
CHLORIDE: 112 MEQ/L (ref 97–107)
CREATININE: 0.64 MG/DL (ref 0.6–1.3)
DIFF_TYPE?: ABNORMAL
EOSINOPHIL: 1 % (ref 0–6)
GLOBULIN_: 2.2 G/DL (ref 2–4.1)
GLUCOSE LVL: 90 MG/DL (ref 70–99)
HCT VFR BLD CALC: 41 % (ref 36–51)
HEMOLYSIS 2+: ABNORMAL
HGB A1C: 5.1 %
HGB BLD-MCNC: 13.7 G/DL (ref 12–17)
IMMATURE GRAN: 0.2 % (ref 0–0.3)
INR: 0.9 (ref 0.9–1.1)
INSTR WBC: 12.5 K/CUMM (ref 4–11)
LIPEMIC 3+: NEGATIVE
LYMPHOCYTE: 18 %
MCH RBC QN AUTO: 29 PG (ref 25–35)
MCHC RBC AUTO-ENTMCNC: 33 G/DL (ref 32–37)
MCV RBC AUTO: 87 FL (ref 78–97)
MONOCYTE: 8 %
NEUTROPHIL: 72 %
NRBC BLD MANUAL-RTO: 0 % (ref 0–0.2)
PLATELET: 312 K/CUMM (ref 150–450)
POTASSIUM: 3.9 MEQ/L (ref 3.5–5.1)
PROTHROMBIN TIME: 9.7 SECONDS (ref 9.7–11.6)
RBC # BLD: 4.72 M/CUMM (ref 4.2–6)
RDW: 12.5 % (ref 11.5–14.5)
SODIUM: 141 MEQ/L (ref 136–145)
TCO2: 22 MEQ/L (ref 19–29)
TOTAL PROTEIN: 5.8 G/DL (ref 6.4–8.3)
WBC # BLD: 12.5 K/CUMM (ref 4–11)

## 2020-01-19 PROCEDURE — 99223 1ST HOSP IP/OBS HIGH 75: CPT | Performed by: HOSPITALIST

## 2020-01-20 LAB
ABS LYMPH: 1.8 K/CUMM (ref 1–3.5)
ABS MONO: 0.6 K/CUMM (ref 0.1–0.8)
ABS NEUTRO: 4.8 K/CUMM (ref 2–8)
ANION GAP SERPL CALC-SCNC: 11 MEQ/L (ref 10–20)
BASOPHIL: 1 % (ref 0–1)
BUN SERPL-MCNC: 15 MG/DL (ref 6–20)
CALCIUM: 8.9 MG/DL (ref 8.4–10.2)
CHLORIDE: 110 MEQ/L (ref 97–107)
CREATININE: 0.74 MG/DL (ref 0.6–1.3)
DIFF_TYPE?: NORMAL
EOSINOPHIL: 2 % (ref 0–6)
GLUCOSE LVL: 94 MG/DL (ref 70–99)
HCT VFR BLD CALC: 39 % (ref 36–51)
HEMOLYSIS 2+: NEGATIVE
HEMOLYSIS 2+: NEGATIVE
HGB BLD-MCNC: 12.9 G/DL (ref 12–17)
IMMATURE GRAN: 0.1 % (ref 0–0.3)
INSTR WBC: 7.5 K/CUMM (ref 4–11)
LYMPHOCYTE: 24 %
MAGNESIUM LEVEL: 2.1 MG/DL (ref 1.6–2.6)
MCH RBC QN AUTO: 29 PG (ref 25–35)
MCHC RBC AUTO-ENTMCNC: 33 G/DL (ref 32–37)
MCV RBC AUTO: 89 FL (ref 78–97)
MONOCYTE: 8 %
NEUTROPHIL: 65 %
NRBC BLD MANUAL-RTO: 0 % (ref 0–0.2)
PLATELET: 259 K/CUMM (ref 150–450)
POTASSIUM: 3.9 MEQ/L (ref 3.5–5.1)
RBC # BLD: 4.42 M/CUMM (ref 4.2–6)
RDW: 12.7 % (ref 11.5–14.5)
SODIUM: 142 MEQ/L (ref 136–145)
TCO2: 25 MEQ/L (ref 19–29)
WBC # BLD: 7.5 K/CUMM (ref 4–11)

## 2020-01-20 PROCEDURE — 99239 HOSP IP/OBS DSCHRG MGMT >30: CPT | Performed by: HOSPITALIST

## 2020-01-28 ENCOUNTER — TELEPHONE (OUTPATIENT)
Dept: FAMILY MEDICINE CLINIC | Facility: CLINIC | Age: 45
End: 2020-01-28

## 2020-01-28 DIAGNOSIS — M51.26 DISPLACEMENT OF LUMBAR INTERVERTEBRAL DISC WITHOUT MYELOPATHY: ICD-10-CM

## 2020-01-30 RX ORDER — HYDROCODONE BITARTRATE AND ACETAMINOPHEN 7.5; 325 MG/1; MG/1
TABLET ORAL
Qty: 150 TABLET | Refills: 0 | Status: SHIPPED | OUTPATIENT
Start: 2020-02-01 | End: 2020-03-05

## 2020-02-03 RX ORDER — MONTELUKAST SODIUM 10 MG/1
TABLET ORAL
Qty: 30 TABLET | Refills: 5 | Status: SHIPPED | OUTPATIENT
Start: 2020-02-03

## 2020-03-05 ENCOUNTER — TELEPHONE (OUTPATIENT)
Dept: FAMILY MEDICINE CLINIC | Facility: CLINIC | Age: 45
End: 2020-03-05

## 2020-03-05 DIAGNOSIS — M51.26 DISPLACEMENT OF LUMBAR INTERVERTEBRAL DISC WITHOUT MYELOPATHY: ICD-10-CM

## 2020-03-05 RX ORDER — HYDROCODONE BITARTRATE AND ACETAMINOPHEN 7.5; 325 MG/1; MG/1
TABLET ORAL
Qty: 150 TABLET | Refills: 0 | Status: SHIPPED | OUTPATIENT
Start: 2020-03-05 | End: 2020-03-31

## 2020-03-31 ENCOUNTER — TELEPHONE (OUTPATIENT)
Dept: FAMILY MEDICINE CLINIC | Facility: CLINIC | Age: 45
End: 2020-03-31

## 2020-03-31 DIAGNOSIS — M51.26 DISPLACEMENT OF LUMBAR INTERVERTEBRAL DISC WITHOUT MYELOPATHY: ICD-10-CM

## 2020-04-02 RX ORDER — ALLOPURINOL 100 MG/1
100 TABLET ORAL 2 TIMES DAILY
Qty: 60 TABLET | Refills: 11 | Status: SHIPPED | OUTPATIENT
Start: 2020-04-02

## 2020-04-02 RX ORDER — HYDROCODONE BITARTRATE AND ACETAMINOPHEN 7.5; 325 MG/1; MG/1
TABLET ORAL
Qty: 150 TABLET | Refills: 0 | Status: SHIPPED | OUTPATIENT
Start: 2020-04-04 | End: 2020-04-16

## 2020-04-16 ENCOUNTER — TELEPHONE (OUTPATIENT)
Dept: FAMILY MEDICINE CLINIC | Facility: CLINIC | Age: 45
End: 2020-04-16

## 2020-04-16 DIAGNOSIS — M51.26 DISPLACEMENT OF LUMBAR INTERVERTEBRAL DISC WITHOUT MYELOPATHY: ICD-10-CM

## 2020-04-16 RX ORDER — HYDROCODONE BITARTRATE AND ACETAMINOPHEN 7.5; 325 MG/1; MG/1
TABLET ORAL
Qty: 150 TABLET | Refills: 0 | Status: SHIPPED | OUTPATIENT
Start: 2020-04-16 | End: 2020-04-23

## 2020-04-16 NOTE — TELEPHONE ENCOUNTER
I called his pharmacy to get information for a prior authorization since he only received 7 day supply on last prescription. Information was received but stated I had to send a new prescription refill to do it.   Refill entered and routed to Dr. Zahra Rangel

## 2020-04-16 NOTE — TELEPHONE ENCOUNTER
Refill---NORCO---  I stated that his insurance is only paying for 7 days, when he picks up he will talk to pharmacist.

## 2020-04-23 ENCOUNTER — TELEPHONE (OUTPATIENT)
Dept: FAMILY MEDICINE CLINIC | Facility: CLINIC | Age: 45
End: 2020-04-23

## 2020-04-23 DIAGNOSIS — M51.26 DISPLACEMENT OF LUMBAR INTERVERTEBRAL DISC WITHOUT MYELOPATHY: ICD-10-CM

## 2020-04-23 RX ORDER — HYDROCODONE BITARTRATE AND ACETAMINOPHEN 7.5; 325 MG/1; MG/1
TABLET ORAL
Qty: 150 TABLET | Refills: 0 | Status: SHIPPED | OUTPATIENT
Start: 2020-04-23 | End: 2020-05-26

## 2020-04-23 NOTE — TELEPHONE ENCOUNTER
Refill----Norco      Patient states he just gets 7 pills,told him their was a prior auth done, that may be all his insurance is paying.

## 2020-05-22 ENCOUNTER — TELEPHONE (OUTPATIENT)
Dept: FAMILY MEDICINE CLINIC | Facility: CLINIC | Age: 45
End: 2020-05-22

## 2020-05-22 DIAGNOSIS — M51.26 DISPLACEMENT OF LUMBAR INTERVERTEBRAL DISC WITHOUT MYELOPATHY: ICD-10-CM

## 2020-05-26 RX ORDER — HYDROCODONE BITARTRATE AND ACETAMINOPHEN 7.5; 325 MG/1; MG/1
TABLET ORAL
Qty: 150 TABLET | Refills: 0 | Status: SHIPPED | OUTPATIENT
Start: 2020-05-26 | End: 2020-06-25

## 2020-06-25 ENCOUNTER — TELEPHONE (OUTPATIENT)
Dept: FAMILY MEDICINE CLINIC | Facility: CLINIC | Age: 45
End: 2020-06-25

## 2020-06-25 DIAGNOSIS — M51.26 DISPLACEMENT OF LUMBAR INTERVERTEBRAL DISC WITHOUT MYELOPATHY: ICD-10-CM

## 2020-06-25 RX ORDER — HYDROCODONE BITARTRATE AND ACETAMINOPHEN 7.5; 325 MG/1; MG/1
TABLET ORAL
Qty: 150 TABLET | Refills: 0 | Status: SHIPPED | OUTPATIENT
Start: 2020-06-25 | End: 2020-07-24

## 2020-07-24 ENCOUNTER — TELEPHONE (OUTPATIENT)
Dept: FAMILY MEDICINE CLINIC | Facility: CLINIC | Age: 45
End: 2020-07-24

## 2020-07-24 DIAGNOSIS — M51.26 DISPLACEMENT OF LUMBAR INTERVERTEBRAL DISC WITHOUT MYELOPATHY: ICD-10-CM

## 2020-07-24 RX ORDER — HYDROCODONE BITARTRATE AND ACETAMINOPHEN 7.5; 325 MG/1; MG/1
TABLET ORAL
Qty: 150 TABLET | Refills: 0 | Status: SHIPPED | OUTPATIENT
Start: 2020-07-24 | End: 2020-08-24

## 2020-08-24 ENCOUNTER — TELEPHONE (OUTPATIENT)
Dept: INTERNAL MEDICINE CLINIC | Facility: CLINIC | Age: 45
End: 2020-08-24

## 2020-08-24 DIAGNOSIS — M51.26 DISPLACEMENT OF LUMBAR INTERVERTEBRAL DISC WITHOUT MYELOPATHY: ICD-10-CM

## 2020-08-24 RX ORDER — HYDROCODONE BITARTRATE AND ACETAMINOPHEN 7.5; 325 MG/1; MG/1
TABLET ORAL
Qty: 150 TABLET | Refills: 0 | Status: SHIPPED | OUTPATIENT
Start: 2020-08-24 | End: 2020-09-24

## 2020-09-24 ENCOUNTER — TELEPHONE (OUTPATIENT)
Dept: INTERNAL MEDICINE CLINIC | Facility: CLINIC | Age: 45
End: 2020-09-24

## 2020-09-24 DIAGNOSIS — M51.26 DISPLACEMENT OF LUMBAR INTERVERTEBRAL DISC WITHOUT MYELOPATHY: ICD-10-CM

## 2020-09-24 RX ORDER — HYDROCODONE BITARTRATE AND ACETAMINOPHEN 7.5; 325 MG/1; MG/1
TABLET ORAL
Qty: 150 TABLET | Refills: 0 | Status: SHIPPED | OUTPATIENT
Start: 2020-09-24 | End: 2020-10-20

## 2020-09-30 RX ORDER — ALBUTEROL SULFATE 90 UG/1
AEROSOL, METERED RESPIRATORY (INHALATION)
Qty: 18 G | Refills: 0 | Status: SHIPPED | OUTPATIENT
Start: 2020-09-30 | End: 2020-10-19

## 2020-10-19 RX ORDER — ALBUTEROL SULFATE 90 UG/1
2 AEROSOL, METERED RESPIRATORY (INHALATION) EVERY 4 HOURS PRN
Qty: 18 G | Refills: 11 | Status: SHIPPED | OUTPATIENT
Start: 2020-10-19

## 2020-10-20 DIAGNOSIS — M51.26 DISPLACEMENT OF LUMBAR INTERVERTEBRAL DISC WITHOUT MYELOPATHY: ICD-10-CM

## 2020-10-20 RX ORDER — VALACYCLOVIR HYDROCHLORIDE 1 G/1
1 TABLET, FILM COATED ORAL EVERY 12 HOURS SCHEDULED
COMMUNITY
End: 2020-10-20

## 2020-10-20 RX ORDER — HYDROCODONE BITARTRATE AND ACETAMINOPHEN 7.5; 325 MG/1; MG/1
TABLET ORAL
Qty: 150 TABLET | Refills: 0 | Status: SHIPPED | OUTPATIENT
Start: 2020-10-23 | End: 2020-11-20

## 2020-10-20 RX ORDER — VALACYCLOVIR HYDROCHLORIDE 1 G/1
1 TABLET, FILM COATED ORAL EVERY 12 HOURS SCHEDULED
Qty: 21 TABLET | Refills: 3 | Status: SHIPPED | OUTPATIENT
Start: 2020-10-20 | End: 2021-05-04

## 2020-11-20 ENCOUNTER — TELEPHONE (OUTPATIENT)
Dept: INTERNAL MEDICINE CLINIC | Facility: CLINIC | Age: 45
End: 2020-11-20

## 2020-11-20 DIAGNOSIS — M51.26 DISPLACEMENT OF LUMBAR INTERVERTEBRAL DISC WITHOUT MYELOPATHY: ICD-10-CM

## 2020-11-20 RX ORDER — HYDROCODONE BITARTRATE AND ACETAMINOPHEN 7.5; 325 MG/1; MG/1
TABLET ORAL
Qty: 150 TABLET | Refills: 0 | Status: SHIPPED | OUTPATIENT
Start: 2020-11-22 | End: 2020-12-18

## 2020-12-18 ENCOUNTER — TELEPHONE (OUTPATIENT)
Dept: INTERNAL MEDICINE CLINIC | Facility: CLINIC | Age: 45
End: 2020-12-18

## 2020-12-18 DIAGNOSIS — M51.26 DISPLACEMENT OF LUMBAR INTERVERTEBRAL DISC WITHOUT MYELOPATHY: ICD-10-CM

## 2020-12-18 RX ORDER — HYDROCODONE BITARTRATE AND ACETAMINOPHEN 7.5; 325 MG/1; MG/1
TABLET ORAL
Qty: 150 TABLET | Refills: 0 | Status: SHIPPED | OUTPATIENT
Start: 2020-12-21 | End: 2021-01-21

## 2021-01-21 ENCOUNTER — TELEPHONE (OUTPATIENT)
Dept: INTERNAL MEDICINE CLINIC | Facility: CLINIC | Age: 46
End: 2021-01-21

## 2021-01-21 DIAGNOSIS — M51.26 DISPLACEMENT OF LUMBAR INTERVERTEBRAL DISC WITHOUT MYELOPATHY: ICD-10-CM

## 2021-01-21 RX ORDER — HYDROCODONE BITARTRATE AND ACETAMINOPHEN 7.5; 325 MG/1; MG/1
TABLET ORAL
Qty: 150 TABLET | Refills: 0 | Status: SHIPPED | OUTPATIENT
Start: 2021-01-21 | End: 2021-02-18

## 2021-02-18 ENCOUNTER — TELEPHONE (OUTPATIENT)
Dept: INTERNAL MEDICINE CLINIC | Facility: CLINIC | Age: 46
End: 2021-02-18

## 2021-02-18 DIAGNOSIS — M51.26 DISPLACEMENT OF LUMBAR INTERVERTEBRAL DISC WITHOUT MYELOPATHY: ICD-10-CM

## 2021-02-18 RX ORDER — HYDROCODONE BITARTRATE AND ACETAMINOPHEN 7.5; 325 MG/1; MG/1
TABLET ORAL
Qty: 150 TABLET | Refills: 0 | Status: SHIPPED | OUTPATIENT
Start: 2021-02-20 | End: 2021-03-22

## 2021-03-22 ENCOUNTER — TELEPHONE (OUTPATIENT)
Dept: INTERNAL MEDICINE CLINIC | Facility: CLINIC | Age: 46
End: 2021-03-22

## 2021-03-22 DIAGNOSIS — M51.26 DISPLACEMENT OF LUMBAR INTERVERTEBRAL DISC WITHOUT MYELOPATHY: ICD-10-CM

## 2021-03-22 RX ORDER — HYDROCODONE BITARTRATE AND ACETAMINOPHEN 7.5; 325 MG/1; MG/1
TABLET ORAL
Qty: 150 TABLET | Refills: 0 | Status: SHIPPED | OUTPATIENT
Start: 2021-03-22 | End: 2021-04-20

## 2021-04-20 ENCOUNTER — TELEPHONE (OUTPATIENT)
Dept: INTERNAL MEDICINE CLINIC | Facility: CLINIC | Age: 46
End: 2021-04-20

## 2021-04-20 DIAGNOSIS — M51.26 DISPLACEMENT OF LUMBAR INTERVERTEBRAL DISC WITHOUT MYELOPATHY: ICD-10-CM

## 2021-04-20 RX ORDER — HYDROCODONE BITARTRATE AND ACETAMINOPHEN 7.5; 325 MG/1; MG/1
TABLET ORAL
Qty: 150 TABLET | Refills: 0 | Status: SHIPPED | OUTPATIENT
Start: 2021-04-21 | End: 2021-05-18

## 2021-05-04 ENCOUNTER — TELEPHONE (OUTPATIENT)
Dept: INTERNAL MEDICINE CLINIC | Facility: CLINIC | Age: 46
End: 2021-05-04

## 2021-05-04 RX ORDER — VALACYCLOVIR HYDROCHLORIDE 1 G/1
1 TABLET, FILM COATED ORAL EVERY 12 HOURS SCHEDULED
Qty: 21 TABLET | Refills: 3 | Status: SHIPPED | OUTPATIENT
Start: 2021-05-04

## 2021-05-18 ENCOUNTER — TELEPHONE (OUTPATIENT)
Dept: INTERNAL MEDICINE CLINIC | Facility: CLINIC | Age: 46
End: 2021-05-18

## 2021-05-18 DIAGNOSIS — M51.26 DISPLACEMENT OF LUMBAR INTERVERTEBRAL DISC WITHOUT MYELOPATHY: ICD-10-CM

## 2021-05-18 RX ORDER — HYDROCODONE BITARTRATE AND ACETAMINOPHEN 7.5; 325 MG/1; MG/1
TABLET ORAL
Qty: 150 TABLET | Refills: 0 | Status: SHIPPED | OUTPATIENT
Start: 2021-05-20 | End: 2021-06-15

## 2021-06-15 ENCOUNTER — TELEPHONE (OUTPATIENT)
Dept: INTERNAL MEDICINE CLINIC | Facility: CLINIC | Age: 46
End: 2021-06-15

## 2021-06-15 DIAGNOSIS — M51.26 DISPLACEMENT OF LUMBAR INTERVERTEBRAL DISC WITHOUT MYELOPATHY: ICD-10-CM

## 2021-06-15 RX ORDER — HYDROCODONE BITARTRATE AND ACETAMINOPHEN 7.5; 325 MG/1; MG/1
TABLET ORAL
Qty: 150 TABLET | Refills: 0 | Status: SHIPPED | OUTPATIENT
Start: 2021-06-19 | End: 2021-07-16

## 2021-06-28 ENCOUNTER — TELEPHONE (OUTPATIENT)
Dept: INTERNAL MEDICINE CLINIC | Facility: CLINIC | Age: 46
End: 2021-06-28

## 2021-07-01 ENCOUNTER — OFFICE VISIT (OUTPATIENT)
Dept: INTERNAL MEDICINE CLINIC | Facility: CLINIC | Age: 46
End: 2021-07-01
Payer: COMMERCIAL

## 2021-07-01 VITALS
DIASTOLIC BLOOD PRESSURE: 80 MMHG | WEIGHT: 304 LBS | BODY MASS INDEX: 35.17 KG/M2 | HEIGHT: 78 IN | HEART RATE: 84 BPM | TEMPERATURE: 98 F | SYSTOLIC BLOOD PRESSURE: 126 MMHG

## 2021-07-01 DIAGNOSIS — Z00.00 ANNUAL PHYSICAL EXAM: Primary | ICD-10-CM

## 2021-07-01 DIAGNOSIS — I10 ESSENTIAL HYPERTENSION, BENIGN: ICD-10-CM

## 2021-07-01 DIAGNOSIS — Z12.11 COLON CANCER SCREENING: ICD-10-CM

## 2021-07-01 DIAGNOSIS — M25.551 RIGHT HIP PAIN: ICD-10-CM

## 2021-07-01 DIAGNOSIS — Z72.0 TOBACCO ABUSE: ICD-10-CM

## 2021-07-01 DIAGNOSIS — M48.062 SPINAL STENOSIS OF LUMBAR REGION WITH NEUROGENIC CLAUDICATION: ICD-10-CM

## 2021-07-01 PROCEDURE — 3008F BODY MASS INDEX DOCD: CPT | Performed by: INTERNAL MEDICINE

## 2021-07-01 PROCEDURE — 3074F SYST BP LT 130 MM HG: CPT | Performed by: INTERNAL MEDICINE

## 2021-07-01 PROCEDURE — 3079F DIAST BP 80-89 MM HG: CPT | Performed by: INTERNAL MEDICINE

## 2021-07-01 PROCEDURE — 99396 PREV VISIT EST AGE 40-64: CPT | Performed by: INTERNAL MEDICINE

## 2021-07-01 RX ORDER — VARENICLINE TARTRATE 0.5 (11)-1
0.5 KIT ORAL 2 TIMES DAILY
Qty: 1 EACH | Refills: 0 | Status: SHIPPED | OUTPATIENT
Start: 2021-07-01 | End: 2021-07-31

## 2021-07-01 RX ORDER — SILDENAFIL 100 MG/1
100 TABLET, FILM COATED ORAL AS NEEDED
Qty: 24 TABLET | Refills: 3 | Status: SHIPPED | OUTPATIENT
Start: 2021-07-01

## 2021-07-01 NOTE — PROGRESS NOTES
HPI:    Patient ID: Maxime Pope is a 39year old male. HPIpatient is stable, has issues with ED lately , not a full erection. Has not quit smoking, would like to try chantix again .  No chest pain , no shortness of breath , no change sin back pain External Ointment APPLY EXTERNALLY TO THE AFFECTED AREA TWICE DAILY AS NEEDED 30 g 3   • MONTELUKAST SODIUM 10 MG Oral Tab TAKE 1 TABLET(10 MG) BY MOUTH EVERY NIGHT (Patient not taking: Reported on 7/1/2021) 30 tablet 5   • indomethacin 50 MG Oral Cap Take General: No tenderness. Cervical back: Normal range of motion and neck supple. Lymphadenopathy:      Cervical: No cervical adenopathy. Skin:     General: Skin is warm and dry. Coloration: Skin is not pale. Findings: No erythema or rash.

## 2021-07-16 ENCOUNTER — TELEPHONE (OUTPATIENT)
Dept: INTERNAL MEDICINE CLINIC | Facility: CLINIC | Age: 46
End: 2021-07-16

## 2021-07-16 DIAGNOSIS — M51.26 DISPLACEMENT OF LUMBAR INTERVERTEBRAL DISC WITHOUT MYELOPATHY: ICD-10-CM

## 2021-07-16 RX ORDER — HYDROCODONE BITARTRATE AND ACETAMINOPHEN 7.5; 325 MG/1; MG/1
TABLET ORAL
Qty: 150 TABLET | Refills: 0 | Status: SHIPPED | OUTPATIENT
Start: 2021-07-18 | End: 2021-08-16

## 2021-08-16 ENCOUNTER — TELEPHONE (OUTPATIENT)
Dept: INTERNAL MEDICINE CLINIC | Facility: CLINIC | Age: 46
End: 2021-08-16

## 2021-08-16 DIAGNOSIS — M51.26 DISPLACEMENT OF LUMBAR INTERVERTEBRAL DISC WITHOUT MYELOPATHY: ICD-10-CM

## 2021-08-16 RX ORDER — HYDROCODONE BITARTRATE AND ACETAMINOPHEN 7.5; 325 MG/1; MG/1
TABLET ORAL
Qty: 150 TABLET | Refills: 0 | Status: SHIPPED | OUTPATIENT
Start: 2021-08-17 | End: 2021-09-16

## 2021-09-16 ENCOUNTER — TELEPHONE (OUTPATIENT)
Dept: INTERNAL MEDICINE CLINIC | Facility: CLINIC | Age: 46
End: 2021-09-16

## 2021-09-16 DIAGNOSIS — M51.26 DISPLACEMENT OF LUMBAR INTERVERTEBRAL DISC WITHOUT MYELOPATHY: ICD-10-CM

## 2021-09-16 RX ORDER — HYDROCODONE BITARTRATE AND ACETAMINOPHEN 7.5; 325 MG/1; MG/1
TABLET ORAL
Qty: 150 TABLET | Refills: 0 | Status: SHIPPED | OUTPATIENT
Start: 2021-09-16 | End: 2021-09-23

## 2021-09-20 ENCOUNTER — TELEPHONE (OUTPATIENT)
Dept: INTERNAL MEDICINE CLINIC | Facility: CLINIC | Age: 46
End: 2021-09-20

## 2021-09-20 NOTE — TELEPHONE ENCOUNTER
Patient was notified of approved prior authorization for his Norco.  He had already picked up a 7 day supply which was waiting for  at the pharmacy. He stated he would call back next week for a new prescription.

## 2021-09-23 ENCOUNTER — TELEPHONE (OUTPATIENT)
Dept: INTERNAL MEDICINE CLINIC | Facility: CLINIC | Age: 46
End: 2021-09-23

## 2021-09-23 DIAGNOSIS — M51.26 DISPLACEMENT OF LUMBAR INTERVERTEBRAL DISC WITHOUT MYELOPATHY: ICD-10-CM

## 2021-09-23 RX ORDER — HYDROCODONE BITARTRATE AND ACETAMINOPHEN 7.5; 325 MG/1; MG/1
TABLET ORAL
Qty: 150 TABLET | Refills: 0 | Status: SHIPPED | OUTPATIENT
Start: 2021-09-23 | End: 2021-10-20

## 2021-09-23 NOTE — TELEPHONE ENCOUNTER
Only 7 days of medication was given to the patient on 9/16/21. It has since been approved by insurance.

## 2021-10-19 DIAGNOSIS — M51.26 DISPLACEMENT OF LUMBAR INTERVERTEBRAL DISC WITHOUT MYELOPATHY: ICD-10-CM

## 2021-10-20 RX ORDER — HYDROCODONE BITARTRATE AND ACETAMINOPHEN 7.5; 325 MG/1; MG/1
TABLET ORAL
Qty: 150 TABLET | Refills: 0 | Status: SHIPPED | OUTPATIENT
Start: 2021-10-22 | End: 2021-11-18

## 2021-10-28 ENCOUNTER — OFFICE VISIT (OUTPATIENT)
Dept: INTERNAL MEDICINE CLINIC | Facility: CLINIC | Age: 46
End: 2021-10-28
Payer: COMMERCIAL

## 2021-10-28 VITALS
WEIGHT: 295 LBS | DIASTOLIC BLOOD PRESSURE: 74 MMHG | HEIGHT: 78 IN | BODY MASS INDEX: 34.13 KG/M2 | HEART RATE: 84 BPM | TEMPERATURE: 99 F | SYSTOLIC BLOOD PRESSURE: 114 MMHG

## 2021-10-28 DIAGNOSIS — Z72.0 TOBACCO ABUSE: ICD-10-CM

## 2021-10-28 DIAGNOSIS — Z00.00 ANNUAL PHYSICAL EXAM: Primary | ICD-10-CM

## 2021-10-28 PROCEDURE — 3078F DIAST BP <80 MM HG: CPT | Performed by: INTERNAL MEDICINE

## 2021-10-28 PROCEDURE — 99396 PREV VISIT EST AGE 40-64: CPT | Performed by: INTERNAL MEDICINE

## 2021-10-28 PROCEDURE — 3074F SYST BP LT 130 MM HG: CPT | Performed by: INTERNAL MEDICINE

## 2021-10-28 PROCEDURE — 3008F BODY MASS INDEX DOCD: CPT | Performed by: INTERNAL MEDICINE

## 2021-10-28 RX ORDER — BUPROPION HYDROCHLORIDE 150 MG/1
150 TABLET, EXTENDED RELEASE ORAL 2 TIMES DAILY
Qty: 60 TABLET | Refills: 3 | Status: SHIPPED | OUTPATIENT
Start: 2021-10-28

## 2021-10-28 NOTE — PROGRESS NOTES
HPI:    Patient ID: Yoandy Vinson is a 55year old male. HPIPatient denies chest pain , no shortness of breath , no swelling, no headaches, no fver no chills, refuses vaccines for flu and covid. Has order for colonoscopy.   Weight is down a few pounds MONTELUKAST SODIUM 10 MG Oral Tab TAKE 1 TABLET(10 MG) BY MOUTH EVERY NIGHT (Patient not taking: No sig reported) 30 tablet 5     Allergies:  Kiwi Extract            Tightness in Throat    Comment:Flushing in cheeks  Penicillins             ITCHING, FEVER cranial nerve deficit. Motor: No abnormal muscle tone. Coordination: Coordination normal.      Deep Tendon Reflexes: Reflexes normal.   Psychiatric:         Behavior: Behavior normal.         Thought Content:  Thought content normal.         Judgm

## 2021-11-17 ENCOUNTER — TELEPHONE (OUTPATIENT)
Dept: INTERNAL MEDICINE CLINIC | Facility: CLINIC | Age: 46
End: 2021-11-17

## 2021-11-17 NOTE — TELEPHONE ENCOUNTER
Per Suzanne, Sildenafil Citrate 100mg Tablets is not covered under patient's insurance and needs a prior authorization. Login to [email or web address] and click \"Enter a Key\".  Enter the patient's last name, date of birth and the Key:    Key: [facility]

## 2021-11-18 DIAGNOSIS — M51.26 DISPLACEMENT OF LUMBAR INTERVERTEBRAL DISC WITHOUT MYELOPATHY: ICD-10-CM

## 2021-11-18 RX ORDER — HYDROCODONE BITARTRATE AND ACETAMINOPHEN 7.5; 325 MG/1; MG/1
TABLET ORAL
Qty: 150 TABLET | Refills: 0 | Status: SHIPPED | OUTPATIENT
Start: 2021-11-21 | End: 2021-12-17

## 2021-11-18 NOTE — TELEPHONE ENCOUNTER
Please review; protocol failed/no protocol    Requested Prescriptions   Pending Prescriptions Disp Refills    HYDROcodone-acetaminophen 7.5-325 MG Oral Tab 150 tablet 0     Sig: Take one every 6 to 8 hours as needed for pain not to exceed 5 tablets in 24 hours        There is no refill protocol information for this order            Recent Outpatient Visits              3 weeks ago [facility]    [facility]    [facility]    2 [facility] [address] [facility]    Office Visit    3 years ago Acute idiopathic gout [facility] left ankle    [facility]    3 years ago [facility]ole[facility] [address] Steve [facility]    Office Visit

## 2021-11-18 NOTE — TELEPHONE ENCOUNTER
Prior authorization approved Case ID: [id number]      Payer:  [address]    [phone]     [phone]    CaseId:[id number];Status:Approved; Review Type:Prior Auth; Coverage Start Date:10/18/2021; Coverage End Date:11/17/2022;     Approval D

## 2021-12-17 DIAGNOSIS — M51.26 DISPLACEMENT OF LUMBAR INTERVERTEBRAL DISC WITHOUT MYELOPATHY: ICD-10-CM

## 2021-12-17 RX ORDER — HYDROCODONE BITARTRATE AND ACETAMINOPHEN 7.5; 325 MG/1; MG/1
TABLET ORAL
Qty: 150 TABLET | Refills: 0 | Status: SHIPPED | OUTPATIENT
Start: 2021-12-20 | End: 2022-01-18

## 2022-01-03 ENCOUNTER — NURSE TRIAGE (OUTPATIENT)
Dept: INTERNAL MEDICINE CLINIC | Facility: CLINIC | Age: 47
End: 2022-01-03

## 2022-01-03 ENCOUNTER — LAB ENCOUNTER (OUTPATIENT)
Dept: LAB | Age: 47
End: 2022-01-03
Attending: INTERNAL MEDICINE
Payer: COMMERCIAL

## 2022-01-03 DIAGNOSIS — B34.9 VIRAL SYNDROME: Primary | ICD-10-CM

## 2022-01-03 DIAGNOSIS — B34.9 VIRAL SYNDROME: ICD-10-CM

## 2022-01-03 NOTE — TELEPHONE ENCOUNTER
Pt contacted and informed him of Dr. Sharon Norwood message. Pt verbalized understanding. Phone number provided to schedule  Advised not to drive with vision issues, states he will try to get a ride.

## 2022-01-03 NOTE — TELEPHONE ENCOUNTER
Patient needs to be tested for Covid first, order signed. Please ask him to notify us of the results. If vision worsens he will need to see ophtho.

## 2022-01-05 LAB — SARS-COV-2 RNA RESP QL NAA+PROBE: DETECTED

## 2022-01-06 ENCOUNTER — TELEPHONE (OUTPATIENT)
Dept: INTERNAL MEDICINE CLINIC | Facility: CLINIC | Age: 47
End: 2022-01-06

## 2022-01-06 NOTE — TELEPHONE ENCOUNTER
Pt calling to leave fax number for his job. Stated he spoke to Sunitha Burns regarding a work note. Fax Number [phone] with attn to Wing Mooney.

## 2022-01-18 DIAGNOSIS — M51.26 DISPLACEMENT OF LUMBAR INTERVERTEBRAL DISC WITHOUT MYELOPATHY: ICD-10-CM

## 2022-01-18 RX ORDER — HYDROCODONE BITARTRATE AND ACETAMINOPHEN 7.5; 325 MG/1; MG/1
TABLET ORAL
Qty: 150 TABLET | Refills: 0 | Status: SHIPPED | OUTPATIENT
Start: 2022-01-19 | End: 2022-02-14

## 2022-01-18 NOTE — TELEPHONE ENCOUNTER
Please review. Protocol failed/ No protocol      Requested Prescriptions   Pending Prescriptions Disp Refills    HYDROcodone-acetaminophen 7.5-325 MG Oral Tab 150 tablet 0     Sig: Take one every 6 to 8 hours as needed for pain not to exceed 5 tablets in 24 hours        There is no refill protocol information for this order                Recent Outpatient Visits              2 months ago [facility] [place]    Office Visit    6 months ago Annual physical exam    [facility]    Office Visit    2 years ago Annual physical exam    [facility], [place], [facility] Serjio Pulliam MD    Office Visit    3 years ago Acute idiopathic gout of left ankle    [facility] [place]    Office Visit    3 years ago Pure hypercholesterolemia    [facility] [place], [facility] Serjio Pulliam MD    Office Visit

## 2022-02-14 RX ORDER — HYDROCODONE BITARTRATE AND ACETAMINOPHEN 7.5; 325 MG/1; MG/1
TABLET ORAL
Qty: 150 TABLET | Refills: 0 | Status: SHIPPED | OUTPATIENT
Start: 2022-02-14 | End: 2022-03-11

## 2022-02-21 RX ORDER — ALBUTEROL SULFATE 90 UG/1
2 AEROSOL, METERED RESPIRATORY (INHALATION) EVERY 4 HOURS PRN
Qty: 17 G | Refills: 11 | Status: SHIPPED | OUTPATIENT
Start: 2022-02-21

## 2022-03-11 DIAGNOSIS — M51.26 DISPLACEMENT OF LUMBAR INTERVERTEBRAL DISC WITHOUT MYELOPATHY: ICD-10-CM

## 2022-03-11 RX ORDER — HYDROCODONE BITARTRATE AND ACETAMINOPHEN 7.5; 325 MG/1; MG/1
TABLET ORAL
Qty: 150 TABLET | Refills: 0 | Status: SHIPPED | OUTPATIENT
Start: 2022-03-15 | End: 2022-04-08

## 2022-04-08 DIAGNOSIS — M51.26 DISPLACEMENT OF LUMBAR INTERVERTEBRAL DISC WITHOUT MYELOPATHY: ICD-10-CM

## 2022-04-08 RX ORDER — ALLOPURINOL 100 MG/1
100 TABLET ORAL 2 TIMES DAILY
Qty: 60 TABLET | Refills: 11 | Status: SHIPPED | OUTPATIENT
Start: 2022-04-08

## 2022-04-08 RX ORDER — HYDROCODONE BITARTRATE AND ACETAMINOPHEN 7.5; 325 MG/1; MG/1
TABLET ORAL
Qty: 150 TABLET | Refills: 0 | Status: SHIPPED | OUTPATIENT
Start: 2022-04-14 | End: 2022-05-12

## 2022-04-08 NOTE — TELEPHONE ENCOUNTER
Please Review. Protocol Failed or has no protocol. Requested Prescriptions   Pending Prescriptions Disp Refills    allopurinol 100 MG Oral Tab 60 tablet 11     Sig: Take 1 tablet (100 mg total) by mouth 2 (two) times daily.         There is no refill protocol information for this order        HYDROcodone-acetaminophen 7.5-325 MG Oral Tab 150 tablet 0     Sig: Take one every 6 to 8 hours as needed for pain not to exceed 5 tablets in 24 hours        There is no refill protocol information for this order         Recent Outpatient Visits              5 months ago Annual physical exam    [address], [place]    Office Visit    9 months ago Annual physical exam    [address], [place]    Office Visit    2 years ago Annual physical exam    [address], [place], [address] Kerry Cisneros MD    Office Visit    3 years ago Acute idiopathic gout of left ankle    [address], [place], [address] Kerry Cisneros MD    Office Visit    4 years ago Pure hypercholesterolemia    [address], [place], [address] Kerry Cisneros MD    Office Visit

## 2022-05-12 DIAGNOSIS — M51.26 DISPLACEMENT OF LUMBAR INTERVERTEBRAL DISC WITHOUT MYELOPATHY: ICD-10-CM

## 2022-05-12 RX ORDER — HYDROCODONE BITARTRATE AND ACETAMINOPHEN 7.5; 325 MG/1; MG/1
TABLET ORAL
Qty: 150 TABLET | Refills: 0 | Status: SHIPPED | OUTPATIENT
Start: 2022-05-13 | End: 2022-06-09

## 2022-05-12 NOTE — TELEPHONE ENCOUNTER
Please review. Protocol failed/ No protocol.     Requested Prescriptions   Pending Prescriptions Disp Refills    HYDROcodone-acetaminophen 7.5-325 MG Oral Tab 150 tablet 0     Sig: Take one every 6 to 8 hours as needed for pain not to exceed 5 tablets in 24 hours        There is no refill protocol information for this order           Recent Outpatient Visits              6 months ago Annual physical exam    [facility], [place], MD [place]    Office Visit    10 months ago Annual physical exam    [facility] [place]    Office Visit    2 years ago Annual physical exam    [facility], [place], [facility] Cameron Knight MD    Office Visit    3 years ago Acute idiopathic gout of left ankle    [facility] [place]    Office Visit    4 years ago Pure hypercholesterolemia    [facility], [place], [facility] Cameron Knight MD    Office Visit

## 2022-06-09 DIAGNOSIS — M51.26 DISPLACEMENT OF LUMBAR INTERVERTEBRAL DISC WITHOUT MYELOPATHY: ICD-10-CM

## 2022-06-09 RX ORDER — HYDROCODONE BITARTRATE AND ACETAMINOPHEN 7.5; 325 MG/1; MG/1
TABLET ORAL
Qty: 150 TABLET | Refills: 0 | Status: SHIPPED | OUTPATIENT
Start: 2022-06-12 | End: 2022-07-06

## 2022-06-09 NOTE — TELEPHONE ENCOUNTER
Please review; protocol failed/no protocol    Requested Prescriptions   Pending Prescriptions Disp Refills    HYDROcodone-acetaminophen 7.5-325 MG Oral Tab 150 tablet 0     Sig: Take one every 6 to 8 hours as needed for pain not to exceed 5 tablets in 24 hours        There is no refill protocol information for this order            Recent Outpatient Visits              7 months ago Annual physical exam    [address] [place] [address], MD Vicki    Office Visit    11 months ago Annual physical exam    [address] [place] [address], MD Vicki    Office Visit    2 years ago Annual physical exam    [address] [place] [address], [facility] Sheela Adam MD    Office Visit    3 years ago Acute idiopathic gout of left ankle    [address] [place] [address], [facility] Sheela Adam MD    Office Visit    4 years ago Pure hypercholesterolemia    [address] [place] [address], [facility] Sheela Adam MD    Office Visit

## 2022-07-06 DIAGNOSIS — M51.26 DISPLACEMENT OF LUMBAR INTERVERTEBRAL DISC WITHOUT MYELOPATHY: ICD-10-CM

## 2022-07-06 RX ORDER — HYDROCODONE BITARTRATE AND ACETAMINOPHEN 7.5; 325 MG/1; MG/1
TABLET ORAL
Qty: 150 TABLET | Refills: 0 | Status: SHIPPED | OUTPATIENT
Start: 2022-07-11 | End: 2022-08-08

## 2022-07-06 NOTE — TELEPHONE ENCOUNTER
Dr Nazia Oliver,    See message below. Last refill 6/12/22 with last office visit 10/28/21 with Dr BRANDT Aguayo.     Please reply to lauren: KRISS Florian

## 2022-08-08 DIAGNOSIS — M51.26 DISPLACEMENT OF LUMBAR INTERVERTEBRAL DISC WITHOUT MYELOPATHY: ICD-10-CM

## 2022-08-08 RX ORDER — HYDROCODONE BITARTRATE AND ACETAMINOPHEN 7.5; 325 MG/1; MG/1
TABLET ORAL
Qty: 150 TABLET | Refills: 0 | Status: SHIPPED | OUTPATIENT
Start: 2022-08-10 | End: 2022-09-06

## 2022-08-08 NOTE — TELEPHONE ENCOUNTER
Patient requesting refill for medication.        HYDROcodone-acetaminophen 7.5-325 MG Oral Tab 150 tablet 0 7/11/2022     Sig: Take one every 6 to 8 hours as needed for pain not to exceed 5 tablets in 24 hours    Sent to pharmacy as: HYDROcodone-Acetaminophen 7.5-325 MG Oral Tablet (Norco)    Earliest Fill Date: 7/11/2022    E-Prescribing Status: Receipt confirmed by pharmacy (7/6/2022 10:56 PM CDT)

## 2022-09-06 DIAGNOSIS — M51.26 DISPLACEMENT OF LUMBAR INTERVERTEBRAL DISC WITHOUT MYELOPATHY: ICD-10-CM

## 2022-09-07 RX ORDER — HYDROCODONE BITARTRATE AND ACETAMINOPHEN 7.5; 325 MG/1; MG/1
TABLET ORAL
Qty: 150 TABLET | Refills: 0 | Status: SHIPPED | OUTPATIENT
Start: 2022-09-09

## 2022-10-03 DIAGNOSIS — M51.26 DISPLACEMENT OF LUMBAR INTERVERTEBRAL DISC WITHOUT MYELOPATHY: ICD-10-CM

## 2022-10-03 RX ORDER — HYDROCODONE BITARTRATE AND ACETAMINOPHEN 7.5; 325 MG/1; MG/1
TABLET ORAL
Qty: 150 TABLET | Refills: 0 | Status: SHIPPED | OUTPATIENT
Start: 2022-10-08 | End: 2022-11-03

## 2022-11-03 DIAGNOSIS — M51.26 DISPLACEMENT OF LUMBAR INTERVERTEBRAL DISC WITHOUT MYELOPATHY: ICD-10-CM

## 2022-11-03 RX ORDER — HYDROCODONE BITARTRATE AND ACETAMINOPHEN 7.5; 325 MG/1; MG/1
TABLET ORAL
Qty: 150 TABLET | Refills: 0 | Status: SHIPPED | OUTPATIENT
Start: 2022-11-07

## 2022-11-14 RX ORDER — SILDENAFIL 100 MG/1
TABLET, FILM COATED ORAL
Qty: 24 TABLET | Refills: 3 | Status: SHIPPED | OUTPATIENT
Start: 2022-11-14

## 2022-11-15 ENCOUNTER — NURSE TRIAGE (OUTPATIENT)
Dept: INTERNAL MEDICINE CLINIC | Facility: CLINIC | Age: 47
End: 2022-11-15

## 2022-11-15 NOTE — TELEPHONE ENCOUNTER
Patient would like to know if he can get antibiotic for sinus infection. Patient has taken covid test all have come out negative. Patient states provider know he gets frequent sinus infection.      [address] [facility]

## 2023-01-03 DIAGNOSIS — M51.26 DISPLACEMENT OF LUMBAR INTERVERTEBRAL DISC WITHOUT MYELOPATHY: ICD-10-CM

## 2023-01-03 NOTE — TELEPHONE ENCOUNTER
Please review. No protocol.   Requested Prescriptions   Pending Prescriptions Disp Refills    HYDROcodone-acetaminophen 7.5-325 MG Oral Tab 150 tablet 0     Sig: Take one every 6 to 8 hours as needed for pain not to exceed 5 tablets in 24 hours       There is no refill protocol information for this order       triamcinolone 0.1 % External Ointment 30 g 0     Sig: APPLY TOPICALLY TO THE AFFECTED AREA TWICE DAILY AS NEEDED       There is no refill protocol information for this order

## 2023-01-05 RX ORDER — HYDROCODONE BITARTRATE AND ACETAMINOPHEN 7.5; 325 MG/1; MG/1
TABLET ORAL
Qty: 150 TABLET | Refills: 0 | Status: SHIPPED | OUTPATIENT
Start: 2023-01-05

## 2023-02-02 DIAGNOSIS — M51.26 DISPLACEMENT OF LUMBAR INTERVERTEBRAL DISC WITHOUT MYELOPATHY: ICD-10-CM

## 2023-02-02 NOTE — TELEPHONE ENCOUNTER
Patient is requesting refill of HYDROcodone-acetaminophen 7.5-325 MG Oral Tab    [address], [place]

## 2023-02-03 RX ORDER — HYDROCODONE BITARTRATE AND ACETAMINOPHEN 7.5; 325 MG/1; MG/1
TABLET ORAL
Qty: 150 TABLET | Refills: 0 | Status: SHIPPED | OUTPATIENT
Start: 2023-02-05

## 2023-02-13 DIAGNOSIS — M51.26 DISPLACEMENT OF LUMBAR INTERVERTEBRAL DISC WITHOUT MYELOPATHY: ICD-10-CM

## 2023-02-13 RX ORDER — HYDROCODONE BITARTRATE AND ACETAMINOPHEN 7.5; 325 MG/1; MG/1
TABLET ORAL
Qty: 150 TABLET | Refills: 0 | Status: SHIPPED | OUTPATIENT
Start: 2023-03-03

## 2023-03-06 ENCOUNTER — TELEPHONE (OUTPATIENT)
Dept: INTERNAL MEDICINE CLINIC | Facility: CLINIC | Age: 48
End: 2023-03-06

## 2023-03-06 DIAGNOSIS — M51.26 DISPLACEMENT OF LUMBAR INTERVERTEBRAL DISC WITHOUT MYELOPATHY: ICD-10-CM

## 2023-03-16 ENCOUNTER — LAB ENCOUNTER (OUTPATIENT)
Dept: LAB | Age: 48
End: 2023-03-16
Attending: INTERNAL MEDICINE
Payer: COMMERCIAL

## 2023-03-16 ENCOUNTER — OFFICE VISIT (OUTPATIENT)
Dept: INTERNAL MEDICINE CLINIC | Facility: CLINIC | Age: 48
End: 2023-03-16

## 2023-03-16 VITALS
SYSTOLIC BLOOD PRESSURE: 120 MMHG | TEMPERATURE: 98 F | HEIGHT: 78 IN | HEART RATE: 64 BPM | BODY MASS INDEX: 33.21 KG/M2 | DIASTOLIC BLOOD PRESSURE: 80 MMHG | WEIGHT: 287 LBS

## 2023-03-16 DIAGNOSIS — I10 ESSENTIAL HYPERTENSION, BENIGN: ICD-10-CM

## 2023-03-16 DIAGNOSIS — Z87.39 HISTORY OF GOUT: ICD-10-CM

## 2023-03-16 DIAGNOSIS — Z00.00 ANNUAL PHYSICAL EXAM: Primary | ICD-10-CM

## 2023-03-16 DIAGNOSIS — Z12.11 COLON CANCER SCREENING: ICD-10-CM

## 2023-03-16 PROCEDURE — 90471 IMMUNIZATION ADMIN: CPT | Performed by: INTERNAL MEDICINE

## 2023-03-16 PROCEDURE — 3008F BODY MASS INDEX DOCD: CPT | Performed by: INTERNAL MEDICINE

## 2023-03-16 PROCEDURE — 3079F DIAST BP 80-89 MM HG: CPT | Performed by: INTERNAL MEDICINE

## 2023-03-16 PROCEDURE — 99396 PREV VISIT EST AGE 40-64: CPT | Performed by: INTERNAL MEDICINE

## 2023-03-16 PROCEDURE — 90677 PCV20 VACCINE IM: CPT | Performed by: INTERNAL MEDICINE

## 2023-03-16 PROCEDURE — 3074F SYST BP LT 130 MM HG: CPT | Performed by: INTERNAL MEDICINE

## 2023-03-17 LAB
ABSOLUTE BASOPHILS: 94 CELLS/UL (ref 0–200)
ABSOLUTE EOSINOPHILS: 136 CELLS/UL (ref 15–500)
ABSOLUTE LYMPHOCYTES: 2304 CELLS/UL (ref 850–3900)
ABSOLUTE MONOCYTES: 757 CELLS/UL (ref 200–950)
ABSOLUTE NEUTROPHILS: 5211 CELLS/UL (ref 1500–7800)
ALBUMIN/GLOBULIN RATIO: 1.9 (CALC) (ref 1–2.5)
ALBUMIN: 4.7 G/DL (ref 3.6–5.1)
ALKALINE PHOSPHATASE: 80 U/L (ref 36–130)
ALT: 17 U/L (ref 9–46)
AST: 20 U/L (ref 10–40)
BASOPHILS: 1.1 %
BILIRUBIN, TOTAL: 0.5 MG/DL (ref 0.2–1.2)
BUN: 12 MG/DL (ref 7–25)
CALCIUM: 9.9 MG/DL (ref 8.6–10.3)
CARBON DIOXIDE: 22 MMOL/L (ref 20–32)
CHLORIDE: 108 MMOL/L (ref 98–110)
CHOL/HDLC RATIO: 4.7 (CALC)
CHOLESTEROL, TOTAL: 161 MG/DL
CREATININE: 0.8 MG/DL (ref 0.6–1.29)
EGFR: 110 ML/MIN/1.73M2
EOSINOPHILS: 1.6 %
GLOBULIN: 2.5 G/DL (CALC) (ref 1.9–3.7)
GLUCOSE: 78 MG/DL (ref 65–99)
HDL CHOLESTEROL: 34 MG/DL
HEMATOCRIT: 43.4 % (ref 38.5–50)
HEMOGLOBIN A1C: 5.3 % OF TOTAL HGB
HEMOGLOBIN: 14.4 G/DL (ref 13.2–17.1)
LDL-CHOLESTEROL: 101 MG/DL (CALC)
LYMPHOCYTES: 27.1 %
MCH: 29.6 PG (ref 27–33)
MCHC: 33.2 G/DL (ref 32–36)
MCV: 89.1 FL (ref 80–100)
MONOCYTES: 8.9 %
MPV: 9.4 FL (ref 7.5–12.5)
NEUTROPHILS: 61.3 %
NON-HDL CHOLESTEROL: 127 MG/DL (CALC)
PLATELET COUNT: 345 THOUSAND/UL (ref 140–400)
POTASSIUM: 4.7 MMOL/L (ref 3.5–5.3)
PROTEIN, TOTAL: 7.2 G/DL (ref 6.1–8.1)
RDW: 12.6 % (ref 11–15)
RED BLOOD CELL COUNT: 4.87 MILLION/UL (ref 4.2–5.8)
SODIUM: 145 MMOL/L (ref 135–146)
T4 (THYROXINE), TOTAL: 8 MCG/DL (ref 4.9–10.5)
TRIGLYCERIDES: 154 MG/DL
TSH: 2.06 MIU/L (ref 0.4–4.5)
URIC ACID: 7.4 MG/DL (ref 4–8)
WHITE BLOOD CELL COUNT: 8.5 THOUSAND/UL (ref 3.8–10.8)

## 2023-04-03 DIAGNOSIS — M51.26 DISPLACEMENT OF LUMBAR INTERVERTEBRAL DISC WITHOUT MYELOPATHY: ICD-10-CM

## 2023-04-03 RX ORDER — HYDROCODONE BITARTRATE AND ACETAMINOPHEN 7.5; 325 MG/1; MG/1
TABLET ORAL
Qty: 150 TABLET | Refills: 0 | Status: SHIPPED | OUTPATIENT
Start: 2023-04-03

## 2023-05-15 ENCOUNTER — TELEPHONE (OUTPATIENT)
Dept: INTERNAL MEDICINE CLINIC | Facility: CLINIC | Age: 48
End: 2023-05-15

## 2023-05-15 NOTE — TELEPHONE ENCOUNTER
Patient requesting a month's worth of Hydrocodone, as Dr Pily Ford usually does. Last refill was 30 pills, which was only half a month, per patient. Pt is completely out of medication.     Send to [facility] [employer]

## 2023-05-16 NOTE — TELEPHONE ENCOUNTER
My advice is to evaluate the patient's pain needs during the visit later today. If appropriate, refill the pain meds for one month.  Advise patient that if Dr Danita Velasco continues to be out of the office, he may need to establish himself with a new doctor at the [facility] office or elsewhere in the [facility]

## 2023-05-16 NOTE — TELEPHONE ENCOUNTER
Due to Dr. Bekah Hutson absence and SHELLY's advice below this writer called Pain Clinic and spoke to Jodi. He states that the Pain Clinic is fully booked until July. He also explains that all new patients to the Pain Clinic have to be weaned off of their pain medications prior to their first visit at the Pain Clinic and then need to be willing to try interventional pain management like injections and physical therapy. He states that this is the current pain management standard of care and their new providers are not trained to start new patients on medications. Per note below this patient states he has been to the Pain Clinic in the past with no success but no Pain Clinic referrals or visits are in the chart. Please advise on how to handle this patient (and Dr. Bekah Hutson chronic pain patients) in his absence and whether a video visit with VICKIE BRAVO today is appropriate. Thank you!

## 2023-05-16 NOTE — TELEPHONE ENCOUNTER
Thank you for scheduling the patient. My thought is that since Dr Jayesh Breaux' leave is indefinite, these patients need to establish care with an interim provider so that each refill of narcotics can be reviewed. If for some reason Dr Jayesh Breaux does not return, these patients will not have their Rx narcotics rejected or have to wait weeks to see someone who is available. We also cannot outright reject the refills as this puts the patient at risk for withdrawal symptoms.

## 2023-05-16 NOTE — TELEPHONE ENCOUNTER
Instruct patient to make an appointment with the pain center.     Cat Chambers DNP  Working with [facility]

## 2023-05-16 NOTE — TELEPHONE ENCOUNTER
Patient called, verified Name and . Relayed SHELLY Hammond's message below. He states he had multiple interventions with Pain Clinic before and none of them works (epidural x 3, spinal decompression). He is requesting short supply of hydrocodone. He agreed to schedule a video visit to have this addressed being that Dr. Manuela Wallace is on indefinite leave.     Future Appointments   Date Time Provider Maria D Walters   2023  7:40 PM Al España APRN [facility] [place]

## 2023-05-16 NOTE — TELEPHONE ENCOUNTER
We will be discussing with the triage team. We instructed them to send the requests to the day's on call provider and provider can determine how they want to handle the short and long term need.

## 2023-05-16 NOTE — TELEPHONE ENCOUNTER
Routing to  on-call due to Dr. Guanaco dietrich with unknown return date. Patient asking for refill for hydrocodone. Upon chart review the following is found:    Last office visit was 3/16/23 for annual physical.    Patient's Problem List:  Neuro  Displacement of lumbar intervertebral disc without myelopathy  Spinal stenosis of lumbar region with neurogenic claudication    Musculoskeletal and Injuries  Right-sided low back pain with sciatica  Acute idiopathic gout of left ankle  Right hip pain        Medication history shows 150 tablets of hydrocodone refilled approximately every month with last 150 tablet quantity sent on 4/3/2023 and then a refill of 30 tablets sent on 5/4/23. Now patient asking for another refill. Please advise on further refills, thank you.

## 2023-06-15 DIAGNOSIS — M51.26 DISPLACEMENT OF LUMBAR INTERVERTEBRAL DISC WITHOUT MYELOPATHY: ICD-10-CM

## 2023-06-15 RX ORDER — HYDROCODONE BITARTRATE AND ACETAMINOPHEN 7.5; 325 MG/1; MG/1
TABLET ORAL
Qty: 120 TABLET | Refills: 0 | Status: SHIPPED | OUTPATIENT
Start: 2023-06-15

## 2023-07-12 DIAGNOSIS — M51.26 DISPLACEMENT OF LUMBAR INTERVERTEBRAL DISC WITHOUT MYELOPATHY: ICD-10-CM

## 2023-07-12 NOTE — TELEPHONE ENCOUNTER
Pt would like a refill on his hydrocodone medication. Pharmacy: [facility]/[place], [place] (listed) per the patient he did make an appointment to see Dr. Jessika Meyer.      Current Outpatient Medications   Medication Sig Dispense Refill    HYDROcodone-acetaminophen 7.5-325 MG Oral Tab Take one every 6 to 8 hours as needed for pain not to exceed 5 tablets in 24 hours 120 tablet 0

## 2023-07-12 NOTE — TELEPHONE ENCOUNTER
Dr. Jayesh Breaux,  I do not see that patient has scheduled with Lucinda Reddy. Medication pended if appropriate.

## 2023-07-13 RX ORDER — HYDROCODONE BITARTRATE AND ACETAMINOPHEN 7.5; 325 MG/1; MG/1
TABLET ORAL
Qty: 120 TABLET | Refills: 0 | Status: SHIPPED | OUTPATIENT
Start: 2023-07-13

## 2023-07-24 RX ORDER — ALBUTEROL SULFATE 90 UG/1
2 AEROSOL, METERED RESPIRATORY (INHALATION) EVERY 4 HOURS PRN
Qty: 17 G | Refills: 11 | Status: SHIPPED | OUTPATIENT
Start: 2023-07-24

## 2023-09-14 DIAGNOSIS — M51.26 DISPLACEMENT OF LUMBAR INTERVERTEBRAL DISC WITHOUT MYELOPATHY: ICD-10-CM

## 2023-09-15 RX ORDER — HYDROCODONE BITARTRATE AND ACETAMINOPHEN 7.5; 325 MG/1; MG/1
TABLET ORAL
Qty: 120 TABLET | Refills: 0 | Status: SHIPPED | OUTPATIENT
Start: 2023-09-15

## 2023-10-09 ENCOUNTER — APPOINTMENT (OUTPATIENT)
Dept: GENERAL RADIOLOGY | Age: 48
End: 2023-10-09
Attending: NURSE PRACTITIONER

## 2023-10-09 ENCOUNTER — NURSE TRIAGE (OUTPATIENT)
Dept: INTERNAL MEDICINE CLINIC | Facility: CLINIC | Age: 48
End: 2023-10-09

## 2023-10-09 ENCOUNTER — HOSPITAL ENCOUNTER (OUTPATIENT)
Age: 48
Setting detail: OBSERVATION
Discharge: HOME | End: 2023-10-11
Attending: INTERNAL MEDICINE | Admitting: INTERNAL MEDICINE

## 2023-10-09 DIAGNOSIS — R94.39 ABNORMAL NUCLEAR STRESS TEST: ICD-10-CM

## 2023-10-09 DIAGNOSIS — M51.26 DISPLACEMENT OF LUMBAR INTERVERTEBRAL DISC WITHOUT MYELOPATHY: ICD-10-CM

## 2023-10-09 DIAGNOSIS — R07.9 CHEST PAIN, UNSPECIFIED TYPE: ICD-10-CM

## 2023-10-09 DIAGNOSIS — R79.89 ELEVATED TROPONIN I LEVEL: Primary | ICD-10-CM

## 2023-10-09 PROBLEM — M54.50 CHRONIC BILATERAL LOW BACK PAIN: Status: ACTIVE | Noted: 2023-10-09

## 2023-10-09 PROBLEM — M10.9 GOUT: Status: ACTIVE | Noted: 2023-10-09

## 2023-10-09 PROBLEM — G89.29 CHRONIC BILATERAL LOW BACK PAIN: Status: ACTIVE | Noted: 2023-10-09

## 2023-10-09 PROBLEM — J45.20 MILD INTERMITTENT ASTHMA WITHOUT COMPLICATION: Status: ACTIVE | Noted: 2023-10-09

## 2023-10-09 PROBLEM — F41.9 ANXIETY: Status: ACTIVE | Noted: 2023-10-09

## 2023-10-09 LAB
ALBUMIN SERPL-MCNC: 4.6 G/DL (ref 3.6–5.1)
ALBUMIN/GLOB SERPL: 1.4 {RATIO} (ref 1–2.4)
ALP SERPL-CCNC: 80 UNITS/L (ref 45–117)
ALT SERPL-CCNC: 18 UNITS/L
ANION GAP SERPL CALC-SCNC: 12 MMOL/L (ref 7–19)
AST SERPL-CCNC: 12 UNITS/L
BASOPHILS # BLD: 0.1 K/MCL (ref 0–0.3)
BASOPHILS NFR BLD: 1 %
BILIRUB SERPL-MCNC: 0.6 MG/DL (ref 0.2–1)
BUN SERPL-MCNC: 13 MG/DL (ref 6–20)
BUN/CREAT SERPL: 15 (ref 7–25)
CALCIUM SERPL-MCNC: 10 MG/DL (ref 8.4–10.2)
CHLORIDE SERPL-SCNC: 102 MMOL/L (ref 97–110)
CO2 SERPL-SCNC: 30 MMOL/L (ref 21–32)
CREAT SERPL-MCNC: 0.87 MG/DL (ref 0.67–1.17)
DEPRECATED RDW RBC: 41.1 FL (ref 39–50)
EGFRCR SERPLBLD CKD-EPI 2021: >90 ML/MIN/{1.73_M2}
EOSINOPHIL # BLD: 0.1 K/MCL (ref 0–0.5)
EOSINOPHIL NFR BLD: 1 %
ERYTHROCYTE [DISTWIDTH] IN BLOOD: 12.4 % (ref 11–15)
FASTING DURATION TIME PATIENT: ABNORMAL H
GLOBULIN SER-MCNC: 3.4 G/DL (ref 2–4)
GLUCOSE SERPL-MCNC: 102 MG/DL (ref 70–99)
HCT VFR BLD CALC: 44.8 % (ref 39–51)
HGB BLD-MCNC: 14.7 G/DL (ref 13–17)
IMM GRANULOCYTES # BLD AUTO: 0 K/MCL (ref 0–0.2)
IMM GRANULOCYTES # BLD: 0 %
LIPASE SERPL-CCNC: 30 UNITS/L (ref 15–77)
LYMPHOCYTES # BLD: 2.6 K/MCL (ref 1–4.8)
LYMPHOCYTES NFR BLD: 27 %
MCH RBC QN AUTO: 29.8 PG (ref 26–34)
MCHC RBC AUTO-ENTMCNC: 32.8 G/DL (ref 32–36.5)
MCV RBC AUTO: 90.9 FL (ref 78–100)
MONOCYTES # BLD: 0.7 K/MCL (ref 0.3–0.9)
MONOCYTES NFR BLD: 7 %
NEUTROPHILS # BLD: 6.4 K/MCL (ref 1.8–7.7)
NEUTROPHILS NFR BLD: 64 %
NRBC BLD MANUAL-RTO: 0 /100 WBC
PLATELET # BLD AUTO: 357 K/MCL (ref 140–450)
POTASSIUM SERPL-SCNC: 3.9 MMOL/L (ref 3.4–5.1)
PROT SERPL-MCNC: 8 G/DL (ref 6.4–8.2)
RBC # BLD: 4.93 MIL/MCL (ref 4.5–5.9)
SODIUM SERPL-SCNC: 140 MMOL/L (ref 135–145)
TROPONIN I SERPL DL<=0.01 NG/ML-MCNC: 82 NG/L
TROPONIN I SERPL DL<=0.01 NG/ML-MCNC: 87 NG/L
TROPONIN I SERPL DL<=0.01 NG/ML-MCNC: 92 NG/L
WBC # BLD: 9.9 K/MCL (ref 4.2–11)

## 2023-10-09 PROCEDURE — 80053 COMPREHEN METABOLIC PANEL: CPT | Performed by: NURSE PRACTITIONER

## 2023-10-09 PROCEDURE — 10002803 HB RX 637: Performed by: PHYSICIAN ASSISTANT

## 2023-10-09 PROCEDURE — 85025 COMPLETE CBC W/AUTO DIFF WBC: CPT | Performed by: NURSE PRACTITIONER

## 2023-10-09 PROCEDURE — 99223 1ST HOSP IP/OBS HIGH 75: CPT | Performed by: INTERNAL MEDICINE

## 2023-10-09 PROCEDURE — G0378 HOSPITAL OBSERVATION PER HR: HCPCS

## 2023-10-09 PROCEDURE — 93005 ELECTROCARDIOGRAM TRACING: CPT | Performed by: INTERNAL MEDICINE

## 2023-10-09 PROCEDURE — 93010 ELECTROCARDIOGRAM REPORT: CPT | Performed by: INTERNAL MEDICINE

## 2023-10-09 PROCEDURE — 71045 X-RAY EXAM CHEST 1 VIEW: CPT

## 2023-10-09 PROCEDURE — 83690 ASSAY OF LIPASE: CPT | Performed by: PHYSICIAN ASSISTANT

## 2023-10-09 PROCEDURE — 10002803 HB RX 637: Performed by: INTERNAL MEDICINE

## 2023-10-09 PROCEDURE — 84484 ASSAY OF TROPONIN QUANT: CPT | Performed by: NURSE PRACTITIONER

## 2023-10-09 PROCEDURE — 36415 COLL VENOUS BLD VENIPUNCTURE: CPT | Performed by: INTERNAL MEDICINE

## 2023-10-09 PROCEDURE — 93005 ELECTROCARDIOGRAM TRACING: CPT | Performed by: PHYSICIAN ASSISTANT

## 2023-10-09 PROCEDURE — 99285 EMERGENCY DEPT VISIT HI MDM: CPT

## 2023-10-09 PROCEDURE — 84484 ASSAY OF TROPONIN QUANT: CPT | Performed by: PHYSICIAN ASSISTANT

## 2023-10-09 PROCEDURE — 83036 HEMOGLOBIN GLYCOSYLATED A1C: CPT | Performed by: NURSE PRACTITIONER

## 2023-10-09 PROCEDURE — 93005 ELECTROCARDIOGRAM TRACING: CPT

## 2023-10-09 PROCEDURE — 84484 ASSAY OF TROPONIN QUANT: CPT | Performed by: INTERNAL MEDICINE

## 2023-10-09 RX ORDER — HYDROCODONE BITARTRATE AND ACETAMINOPHEN 7.5; 325 MG/1; MG/1
TABLET ORAL
Qty: 120 TABLET | Refills: 0 | Status: SHIPPED | OUTPATIENT
Start: 2023-10-14

## 2023-10-09 RX ORDER — HYDROXYZINE HYDROCHLORIDE 25 MG/1
50 TABLET, FILM COATED ORAL ONCE
Status: COMPLETED | OUTPATIENT
Start: 2023-10-09 | End: 2023-10-09

## 2023-10-09 RX ORDER — LANOLIN ALCOHOL/MO/W.PET/CERES
3 CREAM (GRAM) TOPICAL NIGHTLY
COMMUNITY

## 2023-10-09 RX ORDER — AMOXICILLIN 250 MG
2 CAPSULE ORAL DAILY PRN
Status: DISCONTINUED | OUTPATIENT
Start: 2023-10-09 | End: 2023-10-11 | Stop reason: HOSPADM

## 2023-10-09 RX ORDER — ENOXAPARIN SODIUM 100 MG/ML
40 INJECTION SUBCUTANEOUS DAILY
Status: DISCONTINUED | OUTPATIENT
Start: 2023-10-10 | End: 2023-10-11 | Stop reason: HOSPADM

## 2023-10-09 RX ORDER — POLYETHYLENE GLYCOL 3350 17 G/17G
17 POWDER, FOR SOLUTION ORAL DAILY PRN
Status: DISCONTINUED | OUTPATIENT
Start: 2023-10-09 | End: 2023-10-11 | Stop reason: HOSPADM

## 2023-10-09 RX ORDER — LANOLIN ALCOHOL/MO/W.PET/CERES
3 CREAM (GRAM) TOPICAL NIGHTLY
Status: DISCONTINUED | OUTPATIENT
Start: 2023-10-09 | End: 2023-10-11 | Stop reason: HOSPADM

## 2023-10-09 RX ORDER — ONDANSETRON 2 MG/ML
4 INJECTION INTRAMUSCULAR; INTRAVENOUS EVERY 6 HOURS PRN
Status: DISCONTINUED | OUTPATIENT
Start: 2023-10-09 | End: 2023-10-11 | Stop reason: HOSPADM

## 2023-10-09 RX ORDER — MAGNESIUM HYDROXIDE/ALUMINUM HYDROXICE/SIMETHICONE 120; 1200; 1200 MG/30ML; MG/30ML; MG/30ML
30 SUSPENSION ORAL EVERY 4 HOURS PRN
Status: DISCONTINUED | OUTPATIENT
Start: 2023-10-09 | End: 2023-10-11 | Stop reason: HOSPADM

## 2023-10-09 RX ORDER — ALBUTEROL SULFATE 90 UG/1
2 AEROSOL, METERED RESPIRATORY (INHALATION)
Status: DISCONTINUED | OUTPATIENT
Start: 2023-10-09 | End: 2023-10-11 | Stop reason: HOSPADM

## 2023-10-09 RX ORDER — NITROGLYCERIN 0.4 MG/1
0.4 TABLET SUBLINGUAL EVERY 5 MIN PRN
Status: DISCONTINUED | OUTPATIENT
Start: 2023-10-09 | End: 2023-10-11 | Stop reason: HOSPADM

## 2023-10-09 RX ORDER — ALLOPURINOL 100 MG/1
100 TABLET ORAL 2 TIMES DAILY
COMMUNITY
Start: 2023-07-02

## 2023-10-09 RX ORDER — ALLOPURINOL 100 MG/1
100 TABLET ORAL 2 TIMES DAILY
Status: DISCONTINUED | OUTPATIENT
Start: 2023-10-09 | End: 2023-10-11 | Stop reason: HOSPADM

## 2023-10-09 RX ORDER — ALBUTEROL SULFATE 90 UG/1
2 AEROSOL, METERED RESPIRATORY (INHALATION) EVERY 4 HOURS PRN
COMMUNITY
Start: 2023-09-20

## 2023-10-09 RX ORDER — HYDROCODONE BITARTRATE AND ACETAMINOPHEN 7.5; 325 MG/1; MG/1
1 TABLET ORAL EVERY 8 HOURS PRN
Status: DISCONTINUED | OUTPATIENT
Start: 2023-10-09 | End: 2023-10-10

## 2023-10-09 RX ORDER — ACETAMINOPHEN 325 MG/1
650 TABLET ORAL EVERY 4 HOURS PRN
Status: DISCONTINUED | OUTPATIENT
Start: 2023-10-09 | End: 2023-10-11 | Stop reason: SDUPTHER

## 2023-10-09 RX ORDER — 0.9 % SODIUM CHLORIDE 0.9 %
2 VIAL (ML) INJECTION EVERY 12 HOURS SCHEDULED
Status: DISCONTINUED | OUTPATIENT
Start: 2023-10-09 | End: 2023-10-11 | Stop reason: HOSPADM

## 2023-10-09 RX ORDER — HYDROXYZINE HYDROCHLORIDE 10 MG/1
10 TABLET, FILM COATED ORAL EVERY 6 HOURS PRN
Status: DISCONTINUED | OUTPATIENT
Start: 2023-10-09 | End: 2023-10-11 | Stop reason: HOSPADM

## 2023-10-09 RX ORDER — HYDROCODONE BITARTRATE AND ACETAMINOPHEN 7.5; 325 MG/1; MG/1
1 TABLET ORAL EVERY 8 HOURS PRN
COMMUNITY
Start: 2023-09-15

## 2023-10-09 RX ADMIN — ALLOPURINOL 100 MG: 100 TABLET ORAL at 23:10

## 2023-10-09 RX ADMIN — HYDROCODONE BITARTRATE AND ACETAMINOPHEN 1 TABLET: 7.5; 325 TABLET ORAL at 23:10

## 2023-10-09 RX ADMIN — Medication 3 MG: at 23:55

## 2023-10-09 RX ADMIN — HYDROXYZINE HYDROCHLORIDE 50 MG: 25 TABLET ORAL at 17:47

## 2023-10-09 SDOH — ECONOMIC STABILITY: HOUSING INSECURITY: ARE YOU WORRIED ABOUT LOSING YOUR HOUSING?: NO

## 2023-10-09 SDOH — SOCIAL STABILITY: SOCIAL NETWORK: SUPPORT SYSTEMS: FAMILY MEMBERS;CHILDREN

## 2023-10-09 SDOH — ECONOMIC STABILITY: FOOD INSECURITY: HOW OFTEN IN THE PAST 12 MONTHS WERE YOU WORRIED OR STRESSED ABOUT HAVING ENOUGH MONEY TO BUY NUTRITIOUS MEALS?: NEVER

## 2023-10-09 SDOH — ECONOMIC STABILITY: TRANSPORTATION INSECURITY
IN THE PAST 12 MONTHS, HAS LACK OF TRANSPORTATION KEPT YOU FROM MEETINGS, WORK, OR FROM GETTING THINGS NEEDED FOR DAILY LIVING?: NO

## 2023-10-09 SDOH — ECONOMIC STABILITY: HOUSING INSECURITY: WHAT IS YOUR LIVING SITUATION TODAY?: HOUSE

## 2023-10-09 SDOH — HEALTH STABILITY: PHYSICAL HEALTH: DO YOU HAVE DIFFICULTY DRESSING OR BATHING?: NO

## 2023-10-09 SDOH — HEALTH STABILITY: GENERAL: BECAUSE OF A PHYSICAL, MENTAL, OR EMOTIONAL CONDITION, DO YOU HAVE DIFFICULTY DOING ERRANDS ALONE?: NO

## 2023-10-09 SDOH — HEALTH STABILITY: PHYSICAL HEALTH: DO YOU HAVE SERIOUS DIFFICULTY WALKING OR CLIMBING STAIRS?: NO

## 2023-10-09 SDOH — ECONOMIC STABILITY: TRANSPORTATION INSECURITY
IN THE PAST 12 MONTHS, HAS THE LACK OF TRANSPORTATION KEPT YOU FROM MEDICAL APPOINTMENTS OR FROM GETTING MEDICATIONS?: NO

## 2023-10-09 SDOH — SOCIAL STABILITY: SOCIAL NETWORK
HOW OFTEN DO YOU SEE OR TALK TO PEOPLE THAT YOU CARE ABOUT AND FEEL CLOSE TO? (FOR EXAMPLE: TALKING TO FRIENDS ON THE PHONE, VISITING FRIENDS OR FAMILY, GOING TO CHURCH OR CLUB MEETINGS): 3 TO 5 TIMES A WEEK

## 2023-10-09 SDOH — ECONOMIC STABILITY: HOUSING INSECURITY: WHAT IS YOUR LIVING SITUATION TODAY?: SPOUSE;CHILDREN

## 2023-10-09 SDOH — ECONOMIC STABILITY: GENERAL

## 2023-10-09 SDOH — SOCIAL STABILITY: SOCIAL NETWORK: SUPPORT SYSTEMS: FAMILY MEMBERS;FRIENDS;CHILDREN

## 2023-10-09 SDOH — ECONOMIC STABILITY: HOUSING INSECURITY: WHAT IS YOUR LIVING SITUATION TODAY?: CHILDREN

## 2023-10-09 SDOH — HEALTH STABILITY: GENERAL
BECAUSE OF A PHYSICAL, MENTAL, OR EMOTIONAL CONDITION, DO YOU HAVE SERIOUS DIFFICULTY CONCENTRATING, REMEMBERING OR MAKING DECISIONS?: NO

## 2023-10-09 ASSESSMENT — PAIN SCALES - GENERAL
PAINLEVEL_OUTOF10: 0
PAINLEVEL_OUTOF10: 0
PAINLEVEL_OUTOF10: 7

## 2023-10-09 ASSESSMENT — PATIENT HEALTH QUESTIONNAIRE - PHQ9
1. LITTLE INTEREST OR PLEASURE IN DOING THINGS: NOT AT ALL
IS PATIENT ABLE TO COMPLETE PHQ2 OR PHQ9: YES
2. FEELING DOWN, DEPRESSED OR HOPELESS: SEVERAL DAYS
SUM OF ALL RESPONSES TO PHQ9 QUESTIONS 1 AND 2: 1
CLINICAL INTERPRETATION OF PHQ2 SCORE: NO FURTHER SCREENING NEEDED
SUM OF ALL RESPONSES TO PHQ9 QUESTIONS 1 AND 2: 1

## 2023-10-09 ASSESSMENT — HEART SCORE
EKG: NORMAL
RISK FACTORS: 1-2 RISK FACTORS
HISTORY: SLIGHTLY SUSPICIOUS
AGE: GREATER THAN 45 TO LESS THAN 65
TROPONIN: 1-3X NORMAL LIMIT
HEART SCORE: 3

## 2023-10-09 ASSESSMENT — LIFESTYLE VARIABLES
AUDIT-C TOTAL SCORE: 1
HOW OFTEN DO YOU HAVE A DRINK CONTAINING ALCOHOL: MONTHLY OR LESS
ALCOHOL_USE_STATUS: NO OR LOW RISK WITH VALIDATED TOOL
HOW MANY STANDARD DRINKS CONTAINING ALCOHOL DO YOU HAVE ON A TYPICAL DAY: 0,1 OR 2
HOW OFTEN DO YOU HAVE 6 OR MORE DRINKS ON ONE OCCASION: NEVER

## 2023-10-09 ASSESSMENT — ACTIVITIES OF DAILY LIVING (ADL)
ADL_SHORT_OF_BREATH: NO
RECENT_DECLINE_ADL: NO
ADL_SCORE: 12
ADL_BEFORE_ADMISSION: INDEPENDENT

## 2023-10-09 ASSESSMENT — VISUAL ACUITY: OU: 1

## 2023-10-09 ASSESSMENT — COLUMBIA-SUICIDE SEVERITY RATING SCALE - C-SSRS
6. HAVE YOU EVER DONE ANYTHING, STARTED TO DO ANYTHING, OR PREPARED TO DO ANYTHING TO END YOUR LIFE?: NO
2. HAVE YOU ACTUALLY HAD ANY THOUGHTS OF KILLING YOURSELF?: NO
1. WITHIN THE PAST MONTH, HAVE YOU WISHED YOU WERE DEAD OR WISHED YOU COULD GO TO SLEEP AND NOT WAKE UP?: NO
IS THE PATIENT ABLE TO COMPLETE C-SSRS: YES

## 2023-10-09 NOTE — TELEPHONE ENCOUNTER
Action Requested: Summary for Provider     []  Critical Lab, Recommendations Needed  [] Need Additional Advice  [x]   FYI    []   Need Orders  [] Need Medications Sent to Pharmacy  []  Other     SUMMARY: Pt reports chest pain and possible anxiety. Pt chest pain started last Wednesday and gradually getting worse. Pt states he feels like he has something stuck in his throat constantly. Denies jaw pain, visible sweats. Reports difficulty breathing. Advised ER for evaluation. Pt agrees and he will go to [facility] ER, closest to home. Pt states he is able to drive himself. Reason for call:  Anxiety (Lost job few weeks ago and quit smoking)  Onset: Data Unavailable                     Reason for Disposition   Difficulty breathing   Chest pain or 'angina' comes and goes and is happening more often (increasing in frequency) or getting worse (increasing in severity) (Exception: chest pains that last only a few seconds)    Protocols used: Chest Pain-A-[place]

## 2023-10-10 ENCOUNTER — MED REC SCAN ONLY (OUTPATIENT)
Dept: INTERNAL MEDICINE CLINIC | Facility: CLINIC | Age: 48
End: 2023-10-10

## 2023-10-10 ENCOUNTER — APPOINTMENT (OUTPATIENT)
Dept: CARDIOLOGY | Age: 48
End: 2023-10-10
Attending: INTERNAL MEDICINE

## 2023-10-10 ENCOUNTER — APPOINTMENT (OUTPATIENT)
Dept: NUCLEAR MEDICINE | Age: 48
End: 2023-10-10
Attending: INTERNAL MEDICINE

## 2023-10-10 LAB
ALBUMIN SERPL-MCNC: 3.7 G/DL (ref 3.6–5.1)
ALBUMIN/GLOB SERPL: 1.4 {RATIO} (ref 1–2.4)
ALP SERPL-CCNC: 63 UNITS/L (ref 45–117)
ALT SERPL-CCNC: 16 UNITS/L
ANION GAP SERPL CALC-SCNC: 10 MMOL/L (ref 7–19)
AORTIC VALVE AREA (AVA): 0.72
ASCENDING AORTA (AAD): 3
AST SERPL-CCNC: 11 UNITS/L
AV PEAK GRADIENT (AVPG): 5
AV PEAK VELOCITY (AVPV): 1.17
AV STENOSIS SEVERITY TEXT: NORMAL
AVI LVOT PEAK GRADIENT (LVOTMG): 1
BASOPHILS # BLD: 0.1 K/MCL (ref 0–0.3)
BASOPHILS NFR BLD: 1 %
BILIRUB SERPL-MCNC: 0.6 MG/DL (ref 0.2–1)
BUN SERPL-MCNC: 13 MG/DL (ref 6–20)
BUN/CREAT SERPL: 15 (ref 7–25)
CALCIUM SERPL-MCNC: 9.2 MG/DL (ref 8.4–10.2)
CHLORIDE SERPL-SCNC: 105 MMOL/L (ref 97–110)
CHOLEST SERPL-MCNC: 148 MG/DL
CHOLEST/HDLC SERPL: 5.5 {RATIO}
CO2 SERPL-SCNC: 32 MMOL/L (ref 21–32)
CREAT SERPL-MCNC: 0.86 MG/DL (ref 0.67–1.17)
DEPRECATED RDW RBC: 40.8 FL (ref 39–50)
E WAVE DECELARATION TIME (MDT): 6.77
EGFRCR SERPLBLD CKD-EPI 2021: >90 ML/MIN/{1.73_M2}
EOSINOPHIL # BLD: 0.1 K/MCL (ref 0–0.5)
EOSINOPHIL NFR BLD: 2 %
ERYTHROCYTE [DISTWIDTH] IN BLOOD: 12.5 % (ref 11–15)
FASTING DURATION TIME PATIENT: ABNORMAL H
GLOBULIN SER-MCNC: 2.6 G/DL (ref 2–4)
GLUCOSE SERPL-MCNC: 83 MG/DL (ref 70–99)
HCT VFR BLD CALC: 39 % (ref 39–51)
HDLC SERPL-MCNC: 27 MG/DL
HGB BLD-MCNC: 13.1 G/DL (ref 13–17)
IMM GRANULOCYTES # BLD AUTO: 0 K/MCL (ref 0–0.2)
IMM GRANULOCYTES # BLD: 0 %
INTERVENTRICULAR SEPTUM IN END DIASTOLE (IVSD): 2.38
LDLC SERPL CALC-MCNC: 105 MG/DL
LEFT INTERNAL DIMENSION IN SYSTOLE (LVSD): 1
LEFT VENTRICULAR INTERNAL DIMENSION IN DIASTOLE (LVDD): 3.8
LEFT VENTRICULAR POSTERIOR WALL IN END DIASTOLE (LVPW): 5.4
LV EF: NORMAL %
LVOT VTI (LVOTVTI): 0.98
LYMPHOCYTES # BLD: 3.2 K/MCL (ref 1–4.8)
LYMPHOCYTES NFR BLD: 41 %
MAGNESIUM SERPL-MCNC: 2.1 MG/DL (ref 1.7–2.4)
MCH RBC QN AUTO: 29.8 PG (ref 26–34)
MCHC RBC AUTO-ENTMCNC: 33.6 G/DL (ref 32–36.5)
MCV RBC AUTO: 88.8 FL (ref 78–100)
MONOCYTES # BLD: 0.7 K/MCL (ref 0.3–0.9)
MONOCYTES NFR BLD: 9 %
MV E TISSUE VEL LAT (MELV): 0.96
MV E TISSUE VEL MED (MESV): 11.3
MV E WAVE VEL/E TISSUE VEL MED(MSR): 10.7
MV PEAK A VELOCITY (MVPAV): 222
MV PEAK E VELOCITY (MVPEV): 0.66
NEUTROPHILS # BLD: 3.7 K/MCL (ref 1.8–7.7)
NEUTROPHILS NFR BLD: 47 %
NONHDLC SERPL-MCNC: 121 MG/DL
NRBC BLD MANUAL-RTO: 0 /100 WBC
P AXIS (DEGREES): 44
P AXIS (DEGREES): 57
P AXIS (DEGREES): 71
PLATELET # BLD AUTO: 282 K/MCL (ref 140–450)
POTASSIUM SERPL-SCNC: 3.9 MMOL/L (ref 3.4–5.1)
PR-INTERVAL (MSEC): 192
PR-INTERVAL (MSEC): 195
PR-INTERVAL (MSEC): 218
PROT SERPL-MCNC: 6.3 G/DL (ref 6.4–8.2)
QRS-INTERVAL (MSEC): 104
QRS-INTERVAL (MSEC): 106
QRS-INTERVAL (MSEC): 111
QT-INTERVAL (MSEC): 359
QT-INTERVAL (MSEC): 380
QT-INTERVAL (MSEC): 407
QTC: 400
QTC: 403
QTC: 404
R AXIS (DEGREES): 55
R AXIS (DEGREES): 70
R AXIS (DEGREES): 76
RAINBOW EXTRA TUBES HOLD SPECIMEN: NORMAL
RAINBOW EXTRA TUBES HOLD SPECIMEN: NORMAL
RBC # BLD: 4.39 MIL/MCL (ref 4.5–5.9)
REPORT TEXT: NORMAL
SODIUM SERPL-SCNC: 143 MMOL/L (ref 135–145)
STRESS TARGET HR: 172 BPM
T AXIS (DEGREES): 45
T AXIS (DEGREES): 61
T AXIS (DEGREES): 62
TRICUSPID VALVE ANNULAR PEAK VELOCITY (TVAPV): 18
TRIGL SERPL-MCNC: 79 MG/DL
TV ESTIMATED RIGHT ARTERIAL PRESSURE (RAP): 14.1
VENTRICULAR RATE EKG/MIN (BPM): 58
VENTRICULAR RATE EKG/MIN (BPM): 72
VENTRICULAR RATE EKG/MIN (BPM): 84
WBC # BLD: 7.9 K/MCL (ref 4.2–11)

## 2023-10-10 PROCEDURE — 83735 ASSAY OF MAGNESIUM: CPT | Performed by: INTERNAL MEDICINE

## 2023-10-10 PROCEDURE — 93010 ELECTROCARDIOGRAM REPORT: CPT | Performed by: INTERNAL MEDICINE

## 2023-10-10 PROCEDURE — 10002803 HB RX 637: Performed by: INTERNAL MEDICINE

## 2023-10-10 PROCEDURE — 96372 THER/PROPH/DIAG INJ SC/IM: CPT | Performed by: INTERNAL MEDICINE

## 2023-10-10 PROCEDURE — 10004651 HB RX, NO CHARGE ITEM: Performed by: INTERNAL MEDICINE

## 2023-10-10 PROCEDURE — G0378 HOSPITAL OBSERVATION PER HR: HCPCS

## 2023-10-10 PROCEDURE — 36415 COLL VENOUS BLD VENIPUNCTURE: CPT | Performed by: INTERNAL MEDICINE

## 2023-10-10 PROCEDURE — 10006150 HB RX 343: Performed by: INTERNAL MEDICINE

## 2023-10-10 PROCEDURE — 80053 COMPREHEN METABOLIC PANEL: CPT | Performed by: INTERNAL MEDICINE

## 2023-10-10 PROCEDURE — 99222 1ST HOSP IP/OBS MODERATE 55: CPT | Performed by: INTERNAL MEDICINE

## 2023-10-10 PROCEDURE — 80061 LIPID PANEL: CPT | Performed by: INTERNAL MEDICINE

## 2023-10-10 PROCEDURE — 10002800 HB RX 250 W HCPCS: Performed by: INTERNAL MEDICINE

## 2023-10-10 PROCEDURE — 93017 CV STRESS TEST TRACING ONLY: CPT

## 2023-10-10 PROCEDURE — 99232 SBSQ HOSP IP/OBS MODERATE 35: CPT | Performed by: INTERNAL MEDICINE

## 2023-10-10 PROCEDURE — 93306 TTE W/DOPPLER COMPLETE: CPT | Performed by: INTERNAL MEDICINE

## 2023-10-10 PROCEDURE — 93306 TTE W/DOPPLER COMPLETE: CPT

## 2023-10-10 PROCEDURE — 85025 COMPLETE CBC W/AUTO DIFF WBC: CPT | Performed by: INTERNAL MEDICINE

## 2023-10-10 PROCEDURE — 93016 CV STRESS TEST SUPVJ ONLY: CPT | Performed by: INTERNAL MEDICINE

## 2023-10-10 PROCEDURE — 78452 HT MUSCLE IMAGE SPECT MULT: CPT

## 2023-10-10 PROCEDURE — 78452 HT MUSCLE IMAGE SPECT MULT: CPT | Performed by: INTERNAL MEDICINE

## 2023-10-10 PROCEDURE — G1004 CDSM NDSC: HCPCS

## 2023-10-10 PROCEDURE — G1004 CDSM NDSC: HCPCS | Performed by: INTERNAL MEDICINE

## 2023-10-10 PROCEDURE — 93018 CV STRESS TEST I&R ONLY: CPT | Performed by: INTERNAL MEDICINE

## 2023-10-10 PROCEDURE — A9502 TC99M TETROFOSMIN: HCPCS | Performed by: INTERNAL MEDICINE

## 2023-10-10 RX ORDER — SODIUM CHLORIDE 9 MG/ML
INJECTION, SOLUTION INTRAVENOUS ONCE
Status: DISCONTINUED | OUTPATIENT
Start: 2023-10-10 | End: 2023-10-11 | Stop reason: HOSPADM

## 2023-10-10 RX ORDER — NICOTINE 21 MG/24HR
1 PATCH, TRANSDERMAL 24 HOURS TRANSDERMAL DAILY
Status: DISCONTINUED | OUTPATIENT
Start: 2023-10-10 | End: 2023-10-11 | Stop reason: HOSPADM

## 2023-10-10 RX ORDER — REGADENOSON 0.08 MG/ML
0.4 INJECTION, SOLUTION INTRAVENOUS ONCE
Status: DISCONTINUED | OUTPATIENT
Start: 2023-10-10 | End: 2023-10-10 | Stop reason: CLARIF

## 2023-10-10 RX ORDER — ASPIRIN 81 MG/1
81 TABLET ORAL DAILY
Status: DISCONTINUED | OUTPATIENT
Start: 2023-10-10 | End: 2023-10-11

## 2023-10-10 RX ORDER — HYDROCODONE BITARTRATE AND ACETAMINOPHEN 7.5; 325 MG/1; MG/1
1 TABLET ORAL EVERY 6 HOURS PRN
Status: DISCONTINUED | OUTPATIENT
Start: 2023-10-10 | End: 2023-10-11 | Stop reason: HOSPADM

## 2023-10-10 RX ORDER — AMINOPHYLLINE 25 MG/ML
100 INJECTION, SOLUTION INTRAVENOUS ONCE
Status: DISCONTINUED | OUTPATIENT
Start: 2023-10-10 | End: 2023-10-10 | Stop reason: CLARIF

## 2023-10-10 RX ADMIN — TETROFOSMIN 30.8 MILLICURIE: 1.38 INJECTION, POWDER, LYOPHILIZED, FOR SOLUTION INTRAVENOUS at 13:29

## 2023-10-10 RX ADMIN — HYDROCODONE BITARTRATE AND ACETAMINOPHEN 1 TABLET: 7.5; 325 TABLET ORAL at 12:14

## 2023-10-10 RX ADMIN — SODIUM CHLORIDE, PRESERVATIVE FREE 2 ML: 5 INJECTION INTRAVENOUS at 08:30

## 2023-10-10 RX ADMIN — SODIUM CHLORIDE, PRESERVATIVE FREE 2 ML: 5 INJECTION INTRAVENOUS at 21:05

## 2023-10-10 RX ADMIN — Medication 3 MG: at 21:03

## 2023-10-10 RX ADMIN — HYDROCODONE BITARTRATE AND ACETAMINOPHEN 1 TABLET: 7.5; 325 TABLET ORAL at 18:18

## 2023-10-10 RX ADMIN — ENOXAPARIN SODIUM 40 MG: 40 INJECTION SUBCUTANEOUS at 08:31

## 2023-10-10 RX ADMIN — TETROFOSMIN 11 MILLICURIE: 1.38 INJECTION, POWDER, LYOPHILIZED, FOR SOLUTION INTRAVENOUS at 12:26

## 2023-10-10 RX ADMIN — SODIUM CHLORIDE, PRESERVATIVE FREE 2 ML: 5 INJECTION INTRAVENOUS at 06:49

## 2023-10-10 RX ADMIN — ALLOPURINOL 100 MG: 100 TABLET ORAL at 21:03

## 2023-10-10 RX ADMIN — ALLOPURINOL 100 MG: 100 TABLET ORAL at 08:31

## 2023-10-10 RX ADMIN — Medication 1 PATCH: at 17:10

## 2023-10-10 RX ADMIN — ASPIRIN 81 MG: 81 TABLET, COATED ORAL at 09:00

## 2023-10-10 ASSESSMENT — PAIN SCALES - GENERAL
PAINLEVEL_OUTOF10: 0
PAINLEVEL_OUTOF10: 8
PAINLEVEL_OUTOF10: 4
PAINLEVEL_OUTOF10: 8
PAINLEVEL_OUTOF10: 0

## 2023-10-11 VITALS
SYSTOLIC BLOOD PRESSURE: 113 MMHG | WEIGHT: 263.45 LBS | TEMPERATURE: 98.4 F | HEART RATE: 77 BPM | DIASTOLIC BLOOD PRESSURE: 70 MMHG | HEIGHT: 78 IN | RESPIRATION RATE: 18 BRPM | OXYGEN SATURATION: 99 % | BODY MASS INDEX: 30.48 KG/M2

## 2023-10-11 LAB — HBA1C MFR BLD: 5.3 % (ref 4.5–5.6)

## 2023-10-11 PROCEDURE — C1769 GUIDE WIRE: HCPCS | Performed by: INTERNAL MEDICINE

## 2023-10-11 PROCEDURE — G0378 HOSPITAL OBSERVATION PER HR: HCPCS

## 2023-10-11 PROCEDURE — 10002803 HB RX 637: Performed by: INTERNAL MEDICINE

## 2023-10-11 PROCEDURE — C1894 INTRO/SHEATH, NON-LASER: HCPCS | Performed by: INTERNAL MEDICINE

## 2023-10-11 PROCEDURE — 10004651 HB RX, NO CHARGE ITEM: Performed by: INTERNAL MEDICINE

## 2023-10-11 PROCEDURE — 93458 L HRT ARTERY/VENTRICLE ANGIO: CPT | Performed by: INTERNAL MEDICINE

## 2023-10-11 PROCEDURE — 10002800 HB RX 250 W HCPCS: Performed by: INTERNAL MEDICINE

## 2023-10-11 PROCEDURE — 99233 SBSQ HOSP IP/OBS HIGH 50: CPT | Performed by: INTERNAL MEDICINE

## 2023-10-11 PROCEDURE — C1887 CATHETER, GUIDING: HCPCS | Performed by: INTERNAL MEDICINE

## 2023-10-11 PROCEDURE — 10006023 HB SUPPLY 272: Performed by: INTERNAL MEDICINE

## 2023-10-11 PROCEDURE — 13000001 HB PHASE II RECOVERY EA 30 MINUTES: Performed by: INTERNAL MEDICINE

## 2023-10-11 PROCEDURE — 10002801 HB RX 250 W/O HCPCS: Performed by: INTERNAL MEDICINE

## 2023-10-11 PROCEDURE — 10002807 HB RX 258: Performed by: INTERNAL MEDICINE

## 2023-10-11 PROCEDURE — 99152 MOD SED SAME PHYS/QHP 5/>YRS: CPT | Performed by: INTERNAL MEDICINE

## 2023-10-11 PROCEDURE — 99239 HOSP IP/OBS DSCHRG MGMT >30: CPT | Performed by: INTERNAL MEDICINE

## 2023-10-11 PROCEDURE — 76937 US GUIDE VASCULAR ACCESS: CPT | Performed by: INTERNAL MEDICINE

## 2023-10-11 PROCEDURE — 10002805 HB CONTRAST AGENT: Performed by: INTERNAL MEDICINE

## 2023-10-11 RX ORDER — MIDAZOLAM HYDROCHLORIDE 1 MG/ML
INJECTION, SOLUTION INTRAMUSCULAR; INTRAVENOUS PRN
Status: DISCONTINUED | OUTPATIENT
Start: 2023-10-11 | End: 2023-10-11 | Stop reason: HOSPADM

## 2023-10-11 RX ORDER — NITROGLYCERIN 0.4 MG/1
0.4 TABLET SUBLINGUAL EVERY 5 MIN PRN
Status: DISCONTINUED | OUTPATIENT
Start: 2023-10-11 | End: 2023-10-11 | Stop reason: HOSPADM

## 2023-10-11 RX ORDER — ASPIRIN 325 MG
325 TABLET ORAL ONCE
Status: DISCONTINUED | OUTPATIENT
Start: 2023-10-11 | End: 2023-10-11 | Stop reason: HOSPADM

## 2023-10-11 RX ORDER — HYDRALAZINE HYDROCHLORIDE 20 MG/ML
10 INJECTION INTRAMUSCULAR; INTRAVENOUS EVERY 4 HOURS PRN
Status: DISCONTINUED | OUTPATIENT
Start: 2023-10-11 | End: 2023-10-11 | Stop reason: HOSPADM

## 2023-10-11 RX ORDER — ACETAMINOPHEN 650 MG/1
650 SUPPOSITORY RECTAL EVERY 4 HOURS PRN
Status: DISCONTINUED | OUTPATIENT
Start: 2023-10-11 | End: 2023-10-11 | Stop reason: HOSPADM

## 2023-10-11 RX ORDER — DIPHENHYDRAMINE HCL 25 MG
50 CAPSULE ORAL
Status: COMPLETED | OUTPATIENT
Start: 2023-10-11 | End: 2023-10-11

## 2023-10-11 RX ORDER — MIDAZOLAM HYDROCHLORIDE 1 MG/ML
1 INJECTION, SOLUTION INTRAMUSCULAR; INTRAVENOUS PRN
Status: DISCONTINUED | OUTPATIENT
Start: 2023-10-11 | End: 2023-10-11 | Stop reason: HOSPADM

## 2023-10-11 RX ORDER — LIDOCAINE HYDROCHLORIDE 20 MG/ML
INJECTION, SOLUTION EPIDURAL; INFILTRATION; INTRACAUDAL; PERINEURAL PRN
Status: DISCONTINUED | OUTPATIENT
Start: 2023-10-11 | End: 2023-10-11 | Stop reason: HOSPADM

## 2023-10-11 RX ORDER — SODIUM CHLORIDE 9 MG/ML
INJECTION, SOLUTION INTRAVENOUS CONTINUOUS
Status: ACTIVE | OUTPATIENT
Start: 2023-10-11 | End: 2023-10-11

## 2023-10-11 RX ORDER — CLONIDINE HYDROCHLORIDE 0.1 MG/1
0.1 TABLET ORAL EVERY 4 HOURS PRN
Status: DISCONTINUED | OUTPATIENT
Start: 2023-10-11 | End: 2023-10-11 | Stop reason: SDUPTHER

## 2023-10-11 RX ORDER — NICARDIPINE HYDROCHLORIDE 2.5 MG/ML
INJECTION INTRAVENOUS PRN
Status: DISCONTINUED | OUTPATIENT
Start: 2023-10-11 | End: 2023-10-11 | Stop reason: HOSPADM

## 2023-10-11 RX ORDER — DIAZEPAM 5 MG/1
5 TABLET ORAL
Status: COMPLETED | OUTPATIENT
Start: 2023-10-11 | End: 2023-10-11

## 2023-10-11 RX ORDER — ACETAMINOPHEN 325 MG/1
650 TABLET ORAL EVERY 4 HOURS PRN
Status: DISCONTINUED | OUTPATIENT
Start: 2023-10-11 | End: 2023-10-11 | Stop reason: HOSPADM

## 2023-10-11 RX ORDER — 0.9 % SODIUM CHLORIDE 0.9 %
2 VIAL (ML) INJECTION EVERY 12 HOURS SCHEDULED
Status: DISCONTINUED | OUTPATIENT
Start: 2023-10-11 | End: 2023-10-11 | Stop reason: HOSPADM

## 2023-10-11 RX ORDER — CLONIDINE HYDROCHLORIDE 0.1 MG/1
0.1 TABLET ORAL EVERY 4 HOURS PRN
Status: DISCONTINUED | OUTPATIENT
Start: 2023-10-11 | End: 2023-10-11 | Stop reason: HOSPADM

## 2023-10-11 RX ORDER — HEPARIN SODIUM 1000 [USP'U]/ML
INJECTION, SOLUTION INTRAVENOUS; SUBCUTANEOUS PRN
Status: DISCONTINUED | OUTPATIENT
Start: 2023-10-11 | End: 2023-10-11

## 2023-10-11 RX ORDER — LIDOCAINE HYDROCHLORIDE 10 MG/ML
1 INJECTION, SOLUTION INFILTRATION; PERINEURAL PRN
Status: DISCONTINUED | OUTPATIENT
Start: 2023-10-11 | End: 2023-10-11 | Stop reason: HOSPADM

## 2023-10-11 RX ADMIN — SODIUM CHLORIDE, PRESERVATIVE FREE 2 ML: 5 INJECTION INTRAVENOUS at 08:28

## 2023-10-11 RX ADMIN — SODIUM CHLORIDE: 9 INJECTION, SOLUTION INTRAVENOUS at 09:02

## 2023-10-11 RX ADMIN — HYDROCODONE BITARTRATE AND ACETAMINOPHEN 1 TABLET: 7.5; 325 TABLET ORAL at 17:02

## 2023-10-11 RX ADMIN — HYDROCODONE BITARTRATE AND ACETAMINOPHEN 1 TABLET: 7.5; 325 TABLET ORAL at 06:53

## 2023-10-11 RX ADMIN — DIPHENHYDRAMINE HYDROCHLORIDE 50 MG: 25 CAPSULE ORAL at 12:22

## 2023-10-11 RX ADMIN — ALLOPURINOL 100 MG: 100 TABLET ORAL at 08:21

## 2023-10-11 RX ADMIN — DIAZEPAM 5 MG: 5 TABLET ORAL at 12:22

## 2023-10-11 RX ADMIN — ASPIRIN 81 MG: 81 TABLET, COATED ORAL at 08:21

## 2023-10-11 RX ADMIN — Medication 1 PATCH: at 08:24

## 2023-10-11 ASSESSMENT — PAIN SCALES - GENERAL
PAINLEVEL_OUTOF10: 0
PAINLEVEL_OUTOF10: 7
PAINLEVEL_OUTOF10: 0
PAINLEVEL_OUTOF10: 8
PAINLEVEL_OUTOF10: 0
PAINLEVEL_OUTOF10: 2
PAINLEVEL_OUTOF10: 0

## 2023-10-12 ENCOUNTER — OFFICE VISIT (OUTPATIENT)
Dept: INTERNAL MEDICINE CLINIC | Facility: CLINIC | Age: 48
End: 2023-10-12

## 2023-10-12 VITALS
TEMPERATURE: 99 F | DIASTOLIC BLOOD PRESSURE: 80 MMHG | WEIGHT: 271 LBS | HEART RATE: 88 BPM | BODY MASS INDEX: 31.35 KG/M2 | HEIGHT: 78 IN | SYSTOLIC BLOOD PRESSURE: 136 MMHG

## 2023-10-12 DIAGNOSIS — F41.9 ANXIETY: ICD-10-CM

## 2023-10-12 DIAGNOSIS — I10 ESSENTIAL HYPERTENSION, BENIGN: ICD-10-CM

## 2023-10-12 DIAGNOSIS — R07.89 ATYPICAL CHEST PAIN: Primary | ICD-10-CM

## 2023-10-12 PROCEDURE — 3008F BODY MASS INDEX DOCD: CPT | Performed by: INTERNAL MEDICINE

## 2023-10-12 PROCEDURE — 99214 OFFICE O/P EST MOD 30 MIN: CPT | Performed by: INTERNAL MEDICINE

## 2023-10-12 PROCEDURE — 3079F DIAST BP 80-89 MM HG: CPT | Performed by: INTERNAL MEDICINE

## 2023-10-12 PROCEDURE — 3075F SYST BP GE 130 - 139MM HG: CPT | Performed by: INTERNAL MEDICINE

## 2023-10-12 RX ORDER — MELATONIN
3 NIGHTLY
COMMUNITY

## 2023-10-12 RX ORDER — ALPRAZOLAM 0.5 MG/1
0.5 TABLET ORAL 2 TIMES DAILY PRN
Qty: 45 TABLET | Refills: 0 | Status: SHIPPED | OUTPATIENT
Start: 2023-10-12

## 2023-10-12 NOTE — ASSESSMENT & PLAN NOTE
Patient was admitted to the hospital with chest pain , he had a mildly elevated troponin, he then had a positive stress test .   He proceeded to have an angiogram which was negative. He has felt better since leaving the hospital . He has quit smoking . Rx given for prn anxiety .

## 2023-10-12 NOTE — ASSESSMENT & PLAN NOTE
Patient lost his job , had increased anxiety , it is improving , it caused chest pain and globus hystericus. He is interviewing for new jobs, he knows he is due for a colonoscopy , will complete when he has new insurance.

## 2023-10-16 ENCOUNTER — TELEPHONE (OUTPATIENT)
Dept: INTERNAL MEDICINE CLINIC | Facility: CLINIC | Age: 48
End: 2023-10-16

## 2023-10-16 NOTE — TELEPHONE ENCOUNTER
Per Suzanne, Hydrocodone/Acetaminophen 7.5-325 needs a prior authorization. Please call plan at [phone] to initiate a prior authorization or call/fax Suzanne to change medication. Patient ID # is [id number].

## 2023-10-17 NOTE — TELEPHONE ENCOUNTER
Bon with [facility] calling  # tablets per day informed 5. F/u 6 months    Continue all services    Call with any questions or concerns prior to follow up

## 2023-10-17 NOTE — TELEPHONE ENCOUNTER
Sent patient [employer] message to notify:      Approved  [id number]  Prior authorization approved   Case ID: [id number]      Payer: [facility]    [phone]    [phone]   Request Reference Number: [id number]. HYDROCO/APAP TAB 7.5-325 is approved through 11/17/2023. Your patient may now fill this prescription and it will be covered.    Approval Details    Authorization number: [id number]   Authorized from October 17, 2023 to November 17, 2023

## 2023-11-09 DIAGNOSIS — M51.26 DISPLACEMENT OF LUMBAR INTERVERTEBRAL DISC WITHOUT MYELOPATHY: ICD-10-CM

## 2023-11-09 NOTE — TELEPHONE ENCOUNTER
Patient calling to request refill for     HYDROcodone-acetaminophen 7.5-325 MG Oral Tab     To be sent to [facility]     Patient does have a couple of days left of medication. Please call back patient when refill has been sent.

## 2023-11-10 RX ORDER — HYDROCODONE BITARTRATE AND ACETAMINOPHEN 7.5; 325 MG/1; MG/1
TABLET ORAL
Qty: 120 TABLET | Refills: 0 | Status: SHIPPED | OUTPATIENT
Start: 2023-11-13

## 2023-11-10 NOTE — TELEPHONE ENCOUNTER
Please review; protocol failed.  Or has no protocol    Requested Prescriptions   Pending Prescriptions Disp Refills    HYDROcodone-acetaminophen 7.5-325 MG Oral Tab 120 tablet 0     Sig: Take one every 6 to 8 hours as needed for pain not to exceed 5 tablets in 24 hours       There is no refill protocol information for this order           Recent Outpatient Visits              4 weeks ago Atypical chest pain    [facility], Christa Molina, MD Vicki    Office Visit    5 months ago Displacement of lumbar intervertebral disc without myelopathy    [facility], [address], [place] [facility]    Telemedicine    7 months ago Annual physical exam    [address], Breana Sy MD    Office Visit    2 years ago Annual physical exam    [address], Christa Molina, MD Vicki    Office Visit    2 years ago Annual physical exam    [address], Christa Molina, MD Vicki    Office Visit

## 2023-11-28 RX ORDER — ALPRAZOLAM 0.5 MG/1
0.5 TABLET ORAL 2 TIMES DAILY PRN
Qty: 45 TABLET | Refills: 0 | Status: SHIPPED | OUTPATIENT
Start: 2023-11-28

## 2023-11-28 NOTE — TELEPHONE ENCOUNTER
Please review; protocol failed.    Requested Prescriptions   Pending Prescriptions Disp Refills    ALPRAZOLAM 0.5 MG Oral Tab [Pharmacy Med Name: ALPRAZOLAM 0.5MG TABLETS] 45 tablet 0     Sig: TAKE 1 TABLET(0.5 MG) BY MOUTH TWICE DAILY AS NEEDED       There is no refill protocol information for this order        Recent Outpatient Visits              1 month ago Atypical chest pain    [facility], [place] [address], [facility] [address] Eugenie Hart MD    Office Visit    6 months ago Displacement of lumbar intervertebral disc without myelopathy    [facility], [address], [place] Earle Stagers., APR    Telemedicine    8 months ago Annual physical exam    [facility], Mariya Edwards MD    Office Visit    2 years ago Annual physical exam    Pinky Joseph MD    Office Visit    2 years ago Annual physical exam    [facility], [place] [address], Geraldine Nicole MD    Office Visit

## 2023-12-08 DIAGNOSIS — M51.26 DISPLACEMENT OF LUMBAR INTERVERTEBRAL DISC WITHOUT MYELOPATHY: ICD-10-CM

## 2023-12-08 RX ORDER — HYDROCODONE BITARTRATE AND ACETAMINOPHEN 7.5; 325 MG/1; MG/1
TABLET ORAL
Qty: 120 TABLET | Refills: 0 | Status: SHIPPED | OUTPATIENT
Start: 2023-12-13

## 2023-12-08 NOTE — TELEPHONE ENCOUNTER
Pt would like a refill on Rx hyrocodone-acetaminophen 7.5-325MG for 120 tablets.  Please advise         Current Outpatient Medications   Medication Sig Dispense Refill       0    HYDROcodone-acetaminophen 7.5-325 MG Oral Tab Take one every 6 to 8 hours as needed for pain not to exceed 5 tablets in 24 hours 120 tablet 0

## 2023-12-19 NOTE — TELEPHONE ENCOUNTER
Please review. Protocol failed / No protocol.     Requested Prescriptions   Pending Prescriptions Disp Refills    ALPRAZOLAM 0.5 MG Oral Tab [Pharmacy Med Name: ALPRAZOLAM 0.5MG TABLETS] 45 tablet 0     Sig: TAKE 1 TABLET(0.5 MG) BY MOUTH TWICE DAILY AS NEEDED       There is no refill protocol information for this order          Recent Outpatient Visits              2 months ago Atypical chest pain    [facility], [facility] Jil Batista MD    Office Visit    7 months ago Displacement of lumbar intervertebral disc without myelopathy    [facility], [address], [place] Elly Riplatoya., APRN    Telemedicine    9 months ago Annual physical exam    [address], Lissette Bhakta MD    Office Visit    2 years ago Annual physical exam    [address], Christa Molina, MD Vicki    Office Visit    2 years ago Annual physical exam    [address], Christa Molina, MD Vicki    Office Visit

## 2023-12-21 RX ORDER — ALPRAZOLAM 0.5 MG/1
0.5 TABLET ORAL 2 TIMES DAILY PRN
Qty: 45 TABLET | Refills: 3 | Status: SHIPPED | OUTPATIENT
Start: 2023-12-21

## 2024-01-12 DIAGNOSIS — M51.26 DISPLACEMENT OF LUMBAR INTERVERTEBRAL DISC WITHOUT MYELOPATHY: ICD-10-CM

## 2024-01-12 RX ORDER — HYDROCODONE BITARTRATE AND ACETAMINOPHEN 7.5; 325 MG/1; MG/1
TABLET ORAL
Qty: 60 TABLET | Refills: 0 | Status: SHIPPED | OUTPATIENT
Start: 2024-01-12

## 2024-01-12 NOTE — TELEPHONE ENCOUNTER
Please review; protocol failed/ No protocol     Requested Prescriptions   Pending Prescriptions Disp Refills    HYDROcodone-acetaminophen 7.5-325 MG Oral Tab 120 tablet 0     Sig: Take one every 6 to 8 hours as needed for pain not to exceed 5 tablets in 24 hours       There is no refill protocol information for this order          Recent Outpatient Visits              3 months ago Atypical chest pain    [facility] Paulie Beatty MD    Office Visit    8 months ago Displacement of lumbar intervertebral disc without myelopathy    [facility], [place] Sas, Kathryn E., SHELLY    Telemedicine    10 months ago Annual physical exam    [facility] Paulie Garcia MD    Office Visit    2 years ago Annual physical exam    [facility] Paulie Garcia MD    Office Visit    2 years ago Annual physical exam    [facility] Paulie Garcia MD    Office Visit

## 2024-01-12 NOTE — TELEPHONE ENCOUNTER
Routed to RN triage to run for protocol , thanks.     Requested Prescriptions     Pending Prescriptions Disp Refills    HYDROcodone-acetaminophen 7.5-325 MG Oral Tab 120 tablet 0     Sig: Take one every 6 to 8 hours as needed for pain not to exceed 5 tablets in 24 hours       Last Office Visit with PCP: 10/12/2023

## 2024-01-12 NOTE — TELEPHONE ENCOUNTER
Pt would like a refill on Rx hydrocodone- acetaminohen 7.5-325MG for 120 tablets. Please advise       Current Outpatient Medications   Medication Sig Dispense Refill       3    HYDROcodone-acetaminophen 7.5-325 MG Oral Tab Take one every 6 to 8 hours as needed for pain not to exceed 5 tablets in 24 hours 120 tablet 0

## 2024-01-29 DIAGNOSIS — M51.26 DISPLACEMENT OF LUMBAR INTERVERTEBRAL DISC WITHOUT MYELOPATHY: ICD-10-CM

## 2024-01-29 RX ORDER — HYDROCODONE BITARTRATE AND ACETAMINOPHEN 7.5; 325 MG/1; MG/1
TABLET ORAL
Qty: 120 TABLET | Refills: 0 | Status: SHIPPED | OUTPATIENT
Start: 2024-01-29

## 2024-01-29 NOTE — TELEPHONE ENCOUNTER
Patient called to request refill on  below medication:     Medication Quantity Refills Start End   HYDROcodone-acetaminophen 7.5-325 MG Oral Tab 60 tablet 0 1/12/2024

## 2024-01-29 NOTE — TELEPHONE ENCOUNTER
Patient called to request refill on below medication. Pharmacy on file verified.     Medication Quantity Refills Start End   triamcinolone 0.1 % External Ointment 30 g 0 1/5/2023 --

## 2024-01-30 RX ORDER — ALLOPURINOL 100 MG/1
100 TABLET ORAL 2 TIMES DAILY
Qty: 60 TABLET | Refills: 11 | Status: SHIPPED | OUTPATIENT
Start: 2024-01-30

## 2024-01-30 NOTE — TELEPHONE ENCOUNTER
Protocol Failed/ No Protocol    Requested Prescriptions   Pending Prescriptions Disp Refills    ALLOPURINOL 100 MG Oral Tab [Pharmacy Med Name: ALLOPURINOL 100MG TABLETS] 60 tablet 11     Sig: TAKE 1 TABLET(100 MG) BY MOUTH TWICE DAILY       There is no refill protocol information for this order            Recent Outpatient Visits              3 months ago Atypical chest pain    [facility] Paulie Beatty MD    Office Visit    8 months ago Displacement of lumbar intervertebral disc without myelopathy    [facility] [place], [place] Sas, Kathryn E., APRN    Telemedicine    10 months ago Annual physical exam    [facility] Paulie Garcia MD    Office Visit    2 years ago Annual physical exam    [facility] Paulie Beatty MD    Office Visit    2 years ago Annual physical exam    [facility] Paulie Beatty MD    Office Visit

## 2024-01-31 RX ORDER — TRIAMCINOLONE ACETONIDE 1 MG/G
OINTMENT TOPICAL
Qty: 30 G | Refills: 5 | Status: SHIPPED | OUTPATIENT
Start: 2024-01-31

## 2024-01-31 NOTE — TELEPHONE ENCOUNTER
Protocol Failed/ No Protocol    Requested Prescriptions   Pending Prescriptions Disp Refills    triamcinolone 0.1 % External Ointment 30 g 0     Sig: APPLY TOPICALLY TO THE AFFECTED AREA TWICE DAILY AS NEEDED       There is no refill protocol information for this order            Recent Outpatient Visits              3 months ago Atypical chest pain    [facility] Paulie Beatty MD    Office Visit    8 months ago [facility] lumbar intervertebral disc without myelopathy    [facility]    Telemedicine    10 months ago Annual physical exam    [facility] Paulie Garcia MD    Office Visit    2 years ago Annual physical exam    [facility] Paulie Beatty MD    Office Visit    2 years ago Annual physical exam    [facility] Paulie Garcia MD    Office Visit

## 2024-02-29 DIAGNOSIS — M51.26 DISPLACEMENT OF LUMBAR INTERVERTEBRAL DISC WITHOUT MYELOPATHY: ICD-10-CM

## 2024-02-29 NOTE — TELEPHONE ENCOUNTER
Pt would like a refill on Rx hydrocodone-acetaminophen 7.5-325MG for 120 tablets. Please advise       **verified pharmacy [facility] in [place]. [place]     Current Outpatient Medications   Medication Sig Dispense Refill       5       11    HYDROcodone-acetaminophen 7.5-325 MG Oral Tab Take one every 6 to 8 hours as needed for pain not to exceed 5 tablets in 24 hours 120 tablet 0

## 2024-03-02 NOTE — TELEPHONE ENCOUNTER
This medication could not be e-scribed likely due to the nationwide [employer]  issue announced on 2/22/24.    Provider, please print the prescription instead.   Clinical staff, please let the patient know once the printed rx is available for  at your office.      Please review; protocol failed/no protocol.     Requested Prescriptions   Pending Prescriptions Disp Refills    HYDROcodone-acetaminophen 7.5-325 MG Oral Tab 120 tablet 0     Sig: Take one every 6 to 8 hours as needed for pain not to exceed 5 tablets in 24 hours       Controlled Substance Medication Failed - 2/29/2024  3:39 PM        Failed - This medication is a controlled substance - forward to provider to refill              Recent Outpatient Visits              4 months ago Atypical chest pain    [facility] Paulie Beatty MD    Office Visit    9 months ago Displacement of lumbar intervertebral disc without myelopathy    [facility], [address], [place] Irena España, APRN    Telemedicine    11 months ago Annual physical exam    [facility] Paulie Garcia MD    Office Visit    2 years ago Annual physical exam    [facility] Paulie Garcia MD    Office Visit    2 years ago Annual physical exam    [facility] Paulie Beatty MD    Office Visit

## 2024-03-03 RX ORDER — HYDROCODONE BITARTRATE AND ACETAMINOPHEN 7.5; 325 MG/1; MG/1
TABLET ORAL
Qty: 120 TABLET | Refills: 0 | Status: SHIPPED | OUTPATIENT
Start: 2024-03-03

## 2024-03-03 RX ORDER — HYDROCODONE BITARTRATE AND ACETAMINOPHEN 7.5; 325 MG/1; MG/1
TABLET ORAL
Qty: 120 TABLET | Refills: 0 | Status: SHIPPED | OUTPATIENT
Start: 2024-03-03 | End: 2024-03-03

## 2024-03-27 DIAGNOSIS — M51.26 DISPLACEMENT OF LUMBAR INTERVERTEBRAL DISC WITHOUT MYELOPATHY: ICD-10-CM

## 2024-03-27 NOTE — TELEPHONE ENCOUNTER
Patient called requesting a refill on the following medication:    HYDROcodone-acetaminophen 7.5-325 MG Oral Tab     He states that he has some medication left still.

## 2024-03-29 RX ORDER — HYDROCODONE BITARTRATE AND ACETAMINOPHEN 7.5; 325 MG/1; MG/1
TABLET ORAL
Qty: 120 TABLET | Refills: 0 | Status: SHIPPED | OUTPATIENT
Start: 2024-04-02

## 2024-03-29 NOTE — TELEPHONE ENCOUNTER
Please review; protocol failed/ has no protocol    Daphne Thornton days ago     KT  Patient called requesting a refill on the following medication:     HYDROcodone-acetaminophen 7.5-325 MG Oral Tab      He states that he has some medication left still.          Please see message below for upcoming appointment.    Future Appointments   Date Time Provider Department Center   5/23/2024  6:00 PM Paulie Beatty MD [facility]       Requested Prescriptions   Pending Prescriptions Disp Refills    HYDROcodone-acetaminophen 7.5-325 MG Oral Tab 120 tablet 0     Sig: Take one every 6 to 8 hours as needed for pain not to exceed 5 tablets in 24 hours       Controlled Substance Medication Failed - 3/28/2024  1:03 PM        Failed - This medication is a controlled substance - forward to provider to refill           Recent Outpatient Visits              5 months ago Atypical chest pain    [facility] Paulie Beatty MD    Office Visit    10 months ago Displacement of lumbar intervertebral disc without myelopathy    [facility], [address], [place] Irena España APRN    Telemedicine    1 year ago Annual physical exam    [facility] Paulie Beatty MD    Office Visit    2 years ago Annual physical exam    [facility] Paulie Beatty MD    Office Visit    2 years ago Annual physical exam    [facility] Paulie Beatty MD    Office Visit          Future Appointments         Provider Department Appt Notes    In 1 month Paulie Beatty MD [facility] Last px 03/16/2023

## 2024-04-26 DIAGNOSIS — M51.26 DISPLACEMENT OF LUMBAR INTERVERTEBRAL DISC WITHOUT MYELOPATHY: ICD-10-CM

## 2024-04-26 RX ORDER — HYDROCODONE BITARTRATE AND ACETAMINOPHEN 7.5; 325 MG/1; MG/1
TABLET ORAL
Qty: 120 TABLET | Refills: 0 | Status: SHIPPED | OUTPATIENT
Start: 2024-05-03

## 2024-04-26 NOTE — TELEPHONE ENCOUNTER
Please Review. Protocol Failed; No Protocol   Recent fills: Qty: 120 : 01/30/2024, 03/03/2024, 04/02/2024    pt is due 05/03/2024  Last Rx written: 03/29/2024  Last Office Visit: 10/12/2023

## 2024-04-26 NOTE — TELEPHONE ENCOUNTER
Patient called requesting a refill on the following medication:    HYDROcodone-acetaminophen 7.5-325 MG Oral Tab

## 2024-05-23 ENCOUNTER — OFFICE VISIT (OUTPATIENT)
Dept: INTERNAL MEDICINE CLINIC | Facility: CLINIC | Age: 49
End: 2024-05-23

## 2024-05-23 VITALS
HEART RATE: 82 BPM | TEMPERATURE: 99 F | SYSTOLIC BLOOD PRESSURE: 126 MMHG | OXYGEN SATURATION: 99 % | BODY MASS INDEX: 31.82 KG/M2 | DIASTOLIC BLOOD PRESSURE: 78 MMHG | WEIGHT: 275 LBS | HEIGHT: 78 IN

## 2024-05-23 DIAGNOSIS — Z12.11 COLON CANCER SCREENING: ICD-10-CM

## 2024-05-23 DIAGNOSIS — Z00.00 ANNUAL PHYSICAL EXAM: Primary | ICD-10-CM

## 2024-05-23 PROCEDURE — 99396 PREV VISIT EST AGE 40-64: CPT | Performed by: INTERNAL MEDICINE

## 2024-05-23 NOTE — H&P
HPI:    Patient ID: Jordon Mendes is a 48 year old male.    HPIpatient is doing well, no chills no shortness of breath , no swelling.   His back is painful after walking  a half an hour or sitting too long .   No swelling, he has kept 30 lbs off , smokes rarely .   No headaches.   Has not seen ortho in a while. Has fight hip pain , possibly related to his back?    Past Medical History:    Abnormal glucose    High glucose    At high risk for altered glucose metabolism    Back pain    Spinal decompression    High cholesterol    Low blood pressure    Low HDL (under 40)    Sinusitis       Past Surgical History:   Procedure Laterality Date    Excis lesn,palate/uvula  2013    Procedure: EXCISIONAL BIOPSY MOUTH;  Surgeon: Abdias Bergeron MD;  Location: [facility]       Social History     Tobacco Use    Smoking status: Some Days     Current packs/day: 0.00     Average packs/day: 1 pack/day for 10.0 years (10.0 ttl pk-yrs)     Types: Cigarettes     Start date: 10/1/2013     Last attempt to quit: 10/1/2023     Years since quittin.6    Smokeless tobacco: Never   Substance Use Topics    Alcohol use: Not Currently       No family history on file.      ROS:   Review of Systems   Constitutional: Negative.    HENT: Negative.     Eyes: Negative.    Respiratory: Negative.     Cardiovascular:  Negative for chest pain and leg swelling.   Gastrointestinal: Negative.    Endocrine: Negative.    Genitourinary:  Negative for decreased urine volume, difficulty urinating, dysuria, flank pain, frequency, hematuria and urgency.   Musculoskeletal:  Negative for arthralgias, back pain, gait problem, joint swelling and myalgias.   Skin:  Negative for color change, rash and wound.   Allergic/Immunologic: Negative.    Neurological: Negative.  Negative for headaches.   Psychiatric/Behavioral:  Negative for agitation and confusion. The patient is not nervous/anxious.             Current Outpatient Medications   Medication Sig  Dispense Refill    HYDROcodone-acetaminophen 7.5-325 MG Oral Tab Take one every 6 to 8 hours as needed for pain not to exceed 5 tablets in 24 hours 120 tablet 0    ALPRAZolam 0.5 MG Oral Tab Take 1 tablet (0.5 mg total) by mouth 2 (two) times daily as needed. 45 tablet 3    allopurinol 100 MG Oral Tab Take 1 tablet (100 mg total) by mouth 2 (two) times daily. 60 tablet 11    melatonin 3 MG Oral Tab Take 1 tablet (3 mg total) by mouth nightly.      albuterol 108 (90 Base) MCG/ACT Inhalation Aero Soln Inhale 2 puffs into the lungs every 4 (four) hours as needed for Wheezing. 17 g 11    triamcinolone 0.1 % External Ointment APPLY TOPICALLY TO THE AFFECTED AREA TWICE DAILY AS NEEDED 30 g 5    SILDENAFIL CITRATE 100 MG Oral Tab TAKE 1 TABLET BY MOUTH AS NEEDED FOR ERECTILE DYSFUNCTION 24 tablet 3    valACYclovir HCl 1 G Oral Tab Take 1 tablet (1,000 mg total) by mouth every 12 (twelve) hours. 21 tablet 3     Allergies:  Allergies   Allergen Reactions    Kiwi Extract Tightness in Throat and SWELLING     Flushing in cheeks    Flushing in cheeks   Flushing in cheeks    Penicillins FEVER, ITCHING and HIVES      PHYSICAL EXAM:   /78   Pulse 82   Temp 98.9 °F (37.2 °C)   Ht 6' 9\" (2.057 m)   Wt 275 lb (124.7 kg)   SpO2 99%   BMI 29.47 kg/m²     Physical Exam  Constitutional:       General: He is not in acute distress.     Appearance: Normal appearance. He is well-developed. He is not diaphoretic.   HENT:      Right Ear: External ear normal.      Left Ear: External ear normal.      Nose: Nose normal.      Mouth/Throat:      Pharynx: No oropharyngeal exudate.   Eyes:      General: No scleral icterus.        Right eye: No discharge.         Left eye: No discharge.      Conjunctiva/sclera: Conjunctivae normal.      Pupils: Pupils are equal, round, and reactive to light.   Neck:      Thyroid: No thyromegaly.      Vascular: No JVD.      Trachea: No tracheal deviation.   Cardiovascular:      Rate and Rhythm: Normal rate  and regular rhythm.      Heart sounds: Normal heart sounds. No murmur heard.     No friction rub. No gallop.   Pulmonary:      Effort: Pulmonary effort is normal. No respiratory distress.      Breath sounds: Normal breath sounds. No wheezing or rales.   Chest:      Chest wall: No tenderness.   Abdominal:      General: Bowel sounds are normal. There is no distension.      Palpations: Abdomen is soft. There is no mass.      Tenderness: There is no abdominal tenderness. There is no guarding.      Hernia: There is no hernia in the left inguinal area or right inguinal area.   Genitourinary:     Pubic Area: No rash.       Penis: Normal and circumcised.       Testes: Normal. Cremasteric reflex is present.      Epididymis:      Right: Normal.      Left: Normal.      Forrest stage (genital): 5.   Musculoskeletal:         General: No tenderness.      Cervical back: Normal range of motion and neck supple.   Lymphadenopathy:      Cervical: No cervical adenopathy.      Lower Body: No right inguinal adenopathy. No left inguinal adenopathy.   Skin:     General: Skin is warm and dry.      Coloration: Skin is not pale.      Findings: No erythema or rash.   Neurological:      Mental Status: He is alert and oriented to person, place, and time.      Cranial Nerves: No cranial nerve deficit.      Motor: No abnormal muscle tone.      Coordination: Coordination normal.      Deep Tendon Reflexes: Reflexes normal.   Psychiatric:         Behavior: Behavior normal.         Thought Content: Thought content normal.         Judgment: Judgment normal.                ASSESSMENT/PLAN:   Colon cancer screening  Gastro .     Annual physical exam  Physical today   Labs soon   C scope .     Orders Placed This Encounter   Procedures    CBC With Differential With Platelet    Comp Metabolic Panel (14)    Hemoglobin A1C    Lipid Panel    Assay, Thyroid Stim Hormone    Free T4, (Free Thyroxine)       Meds This Visit:  Requested Prescriptions      No  prescriptions requested or ordered in this encounter       Imaging & Referrals:  OP REFERRAL TO [facility] GI TELEPHONE COLON SCREEN       ID#[id number]

## 2024-05-28 DIAGNOSIS — M51.26 DISPLACEMENT OF LUMBAR INTERVERTEBRAL DISC WITHOUT MYELOPATHY: ICD-10-CM

## 2024-05-28 RX ORDER — HYDROCODONE BITARTRATE AND ACETAMINOPHEN 7.5; 325 MG/1; MG/1
TABLET ORAL
Qty: 120 TABLET | Refills: 0 | Status: SHIPPED | OUTPATIENT
Start: 2024-06-03

## 2024-05-28 NOTE — TELEPHONE ENCOUNTER
Per patient he needs refill on his prescription medication HYDROcodone-acetaminophen please send to his pharmacy on file verified.    Current Outpatient Medications   Medication Sig Dispense Refill    HYDROcodone-acetaminophen 7.5-325 MG Oral Tab Take one every 6 to 8 hours as needed for pain not to exceed 5 tablets in 24 hours 120 tablet 0

## 2024-05-28 NOTE — TELEPHONE ENCOUNTER
Recent Fills: 03/03/2024, 04/02/2024, 05/02/2024    Last Rx Written: 05/03/2024    Last Office Visit: 05/23/2024

## 2024-06-28 DIAGNOSIS — M51.26 DISPLACEMENT OF LUMBAR INTERVERTEBRAL DISC WITHOUT MYELOPATHY: ICD-10-CM

## 2024-06-28 NOTE — TELEPHONE ENCOUNTER
Patient requesting refill     [id number] - [facility] [address] [facility] [address]       Medication Detail    Medication Quantity Refills Start End   HYDROcodone-acetaminophen 7.5-325 MG Oral Tab 120 tablet 0 6/3/2024 --   Sig:   Take one every 6 to 8 hours as needed for pain not to exceed 5 tablets in 24 hours     Route:   (none)     Earliest Fill Date:   6/3/2024     Order #:   [id number]

## 2024-06-28 NOTE — TELEPHONE ENCOUNTER
Recent Fills: 04/02/2024, 05/02/2024, 06/03/2024    Last Rx Written: 06/03/2024    Last Office Visit: 05/23/2024

## 2024-06-29 RX ORDER — HYDROCODONE BITARTRATE AND ACETAMINOPHEN 7.5; 325 MG/1; MG/1
TABLET ORAL
Qty: 120 TABLET | Refills: 0 | Status: SHIPPED | OUTPATIENT
Start: 2024-07-03

## 2024-07-29 DIAGNOSIS — F41.9 ANXIETY: ICD-10-CM

## 2024-07-30 DIAGNOSIS — M51.26 DISPLACEMENT OF LUMBAR INTERVERTEBRAL DISC WITHOUT MYELOPATHY: ICD-10-CM

## 2024-07-30 NOTE — TELEPHONE ENCOUNTER
Patient is requesting a refill on his hydrocodone medication. Pharmacy: [facility]/[place], [place] (listed)     Current Outpatient Medications   Medication Sig Dispense Refill    HYDROcodone-acetaminophen 7.5-325 MG Oral Tab Take one every 6 to 8 hours as needed for pain not to exceed 5 tablets in 24 hours 120 tablet 0

## 2024-08-01 RX ORDER — ALPRAZOLAM 0.5 MG/1
0.5 TABLET ORAL 2 TIMES DAILY PRN
Qty: 45 TABLET | Refills: 1 | Status: SHIPPED | OUTPATIENT
Start: 2024-08-01

## 2024-08-01 RX ORDER — HYDROCODONE BITARTRATE AND ACETAMINOPHEN 7.5; 325 MG/1; MG/1
TABLET ORAL
Qty: 120 TABLET | Refills: 0 | Status: SHIPPED | OUTPATIENT
Start: 2024-08-01

## 2024-08-01 NOTE — TELEPHONE ENCOUNTER
Please review; protocol failed    No future appointments.  LAST OFFICE VISIT: 5/23/2024    Recent fills (qty #120 ea for a 30 day supply): 7/3/2024, 6/3/2024, and 5/2/2024  Last prescription written on: 6/29/2024    Requested Prescriptions   Pending Prescriptions Disp Refills    HYDROcodone-acetaminophen 7.5-325 MG Oral Tab 120 tablet 0     Sig: Take one every 6 to 8 hours as needed for pain not to exceed 5 tablets in 24 hours       Controlled Substance Medication Failed - 8/1/2024  1:09 PM        Failed - This medication is a controlled substance - forward to provider to refill               Recent Outpatient Visits              2 months ago Annual physical exam    [facility] Paulie Garcia MD    Office Visit    9 months ago [facility]    Office Visit    1 year ago Displacement of lumbar intervertebral disc without myelopathy    [facility], [address], [place] Irena España, APRN    Telemedicine    1 year ago Annual physical exam    [facility] Paulie Shepard MD    Office Visit    2 years ago Annual physical exam    [facility] Paulie Shepard MD    Office Visit

## 2024-08-01 NOTE — TELEPHONE ENCOUNTER
Please Review. Protocol Failed; No Protocol     Recent fills: Quantity: 45  07/07/2024  06/15/2024  05/17/2024                                                                        Last Rx written: 03/19/2024  Last Office Visit: 05/23/2024  Recent Visits  Date Type Provider Dept   05/23/24 Office Visit Paulie Beatty MD [facility]   10/12/23 Office Visit Paulie Beatty MD [facility]   03/16/23 Office Visit Paulie Beatty MD [facility]   Showing recent visits within past 540 days with a meds authorizing provider and meeting all other requirements  Future Appointments  No visits were found meeting these conditions.  Showing future appointments within next 150 days with a meds authorizing provider and meeting all other requirements      Requested Prescriptions   Pending Prescriptions Disp Refills    ALPRAZOLAM 0.5 MG Oral Tab [Pharmacy Med Name: ALPRAZOLAM 0.5MG TABLETS] 45 tablet 0     Sig: TAKE 1 TABLET(0.5 MG) BY MOUTH TWICE DAILY AS NEEDED       Controlled Substance Medication Failed - 7/29/2024  8:10 PM        Failed - This medication is a controlled substance - forward to provider to refill                 Recent Outpatient Visits              2 months ago Annual physical exam    [facility] Paulie Beatty MD    Office Visit    9 months ago Atypical chest pain    [facility] Paulie Beatty MD    Office Visit    1 year ago Displacement of lumbar intervertebral disc without myelopathy    [facility], [address], [place] Irena España APRN    Telemedicine    1 year ago Annual physical exam    [facility] Paulie Beatty MD    Office Visit    2 years ago Annual physical exam    [facility] Paulie Beatty MD    Office Visit

## 2024-08-05 ENCOUNTER — NURSE TRIAGE (OUTPATIENT)
Dept: INTERNAL MEDICINE CLINIC | Facility: CLINIC | Age: 49
End: 2024-08-05

## 2024-08-05 NOTE — TELEPHONE ENCOUNTER
Action Requested: Summary for Provider     []  Critical Lab, Recommendations Needed  [] Need Additional Advice  []   FYI    []   Need Orders  [] Need Medications Sent to Pharmacy  []  Other     SUMMARY: Office visit  Future Appointments   Date Time Provider Department Center   8/8/2024  4:30 PM Paulie Beatty MD [facility]     Reason for call: Ear Pain  Onset: Data Unavailable    Patient started to have left ear pain yesterday. It feels clogged and hurts when he chews. He also hears a popping or a clicking noise. I offered earlier appointment with other providers but patient declined. Appointment made with Dr. Beatty and added to waitlist. Advised to call back if symptoms worsen.     Reason for Disposition   All other earaches  (Exceptions: Earache lasting < 1 hour, and earache from air travel.)    Protocols used: Earache-A-[place]

## 2024-08-08 ENCOUNTER — OFFICE VISIT (OUTPATIENT)
Dept: INTERNAL MEDICINE CLINIC | Facility: CLINIC | Age: 49
End: 2024-08-08

## 2024-08-08 VITALS
WEIGHT: 266 LBS | BODY MASS INDEX: 30.78 KG/M2 | TEMPERATURE: 99 F | HEIGHT: 78 IN | DIASTOLIC BLOOD PRESSURE: 74 MMHG | SYSTOLIC BLOOD PRESSURE: 122 MMHG | HEART RATE: 84 BPM | OXYGEN SATURATION: 98 %

## 2024-08-08 DIAGNOSIS — R09.81 NASAL CONGESTION: ICD-10-CM

## 2024-08-08 DIAGNOSIS — H65.02 ACUTE SEROUS OTITIS MEDIA OF LEFT EAR, RECURRENCE NOT SPECIFIED: ICD-10-CM

## 2024-08-08 DIAGNOSIS — I10 ESSENTIAL HYPERTENSION, BENIGN: Primary | ICD-10-CM

## 2024-08-08 PROBLEM — H65.112 NON-RECURRENT SUBACUTE ALLERGIC OTITIS MEDIA OF LEFT EAR: Status: ACTIVE | Noted: 2024-08-08

## 2024-08-08 PROBLEM — H65.112 NON-RECURRENT SUBACUTE ALLERGIC OTITIS MEDIA OF LEFT EAR: Status: RESOLVED | Noted: 2024-08-08 | Resolved: 2024-08-08

## 2024-08-08 PROCEDURE — 99214 OFFICE O/P EST MOD 30 MIN: CPT | Performed by: INTERNAL MEDICINE

## 2024-08-08 RX ORDER — CLARITHROMYCIN 500 MG/1
500 TABLET, COATED ORAL 2 TIMES DAILY
Qty: 20 TABLET | Refills: 0 | Status: SHIPPED | OUTPATIENT
Start: 2024-08-08

## 2024-08-08 NOTE — PROGRESS NOTES
HPI:    Patient ID: Jordon Mendes is a 49 year old male.    Ear Pain   Pertinent negatives include no headaches or rash.   patient is here due to a week's worth of left ear pain , not constant .  No chest pain , no shortness of breath , no swelling.  Low grade elevated temperature . Swollen lymph node , no swimming lately .        Review of Systems   Constitutional: Negative.    HENT:  Positive for ear pain.    Eyes: Negative.    Respiratory: Negative.     Cardiovascular:  Negative for chest pain and leg swelling.   Gastrointestinal: Negative.    Endocrine: Negative.    Genitourinary:  Negative for decreased urine volume, difficulty urinating, dysuria, flank pain, frequency, hematuria and urgency.   Musculoskeletal:  Negative for arthralgias, back pain, gait problem, joint swelling and myalgias.   Skin:  Negative for color change, rash and wound.   Allergic/Immunologic: Negative.    Neurological: Negative.  Negative for headaches.   Psychiatric/Behavioral:  Negative for agitation, confusion, decreased concentration and self-injury. The patient is not nervous/anxious.             Current Outpatient Medications   Medication Sig Dispense Refill    Homeopathic Products (EAR PAIN RELIEF HOMEOPATHIC OT) Place in ear(s).      clarithromycin 500 MG Oral Tab Take 1 tablet (500 mg total) by mouth 2 (two) times daily. 20 tablet 0    ALPRAZolam 0.5 MG Oral Tab Take 1 tablet (0.5 mg total) by mouth 2 (two) times daily as needed for Anxiety. 45 tablet 1    HYDROcodone-acetaminophen 7.5-325 MG Oral Tab Take one every 6 to 8 hours as needed for pain not to exceed 5 tablets in 24 hours 120 tablet 0    allopurinol 100 MG Oral Tab Take 1 tablet (100 mg total) by mouth 2 (two) times daily. 60 tablet 11    melatonin 3 MG Oral Tab Take 1 tablet (3 mg total) by mouth nightly.      triamcinolone 0.1 % External Ointment APPLY TOPICALLY TO THE AFFECTED AREA TWICE DAILY AS NEEDED 30 g 5    albuterol 108 (90 Base) MCG/ACT Inhalation Aero  Soln Inhale 2 puffs into the lungs every 4 (four) hours as needed for Wheezing. 17 g 11    SILDENAFIL CITRATE 100 MG Oral Tab TAKE 1 TABLET BY MOUTH AS NEEDED FOR ERECTILE DYSFUNCTION 24 tablet 3    valACYclovir HCl 1 G Oral Tab Take 1 tablet (1,000 mg total) by mouth every 12 (twelve) hours. 21 tablet 3     Allergies:  Allergies   Allergen Reactions    Kiwi Extract Tightness in Throat and SWELLING     Flushing in cheeks    Flushing in cheeks   Flushing in cheeks    Penicillins FEVER, ITCHING and HIVES      PHYSICAL EXAM:   /74   Pulse 84   Temp 99.2 °F (37.3 °C)   Ht 6' 9\" (2.057 m)   Wt 266 lb (120.7 kg)   SpO2 98%   BMI 28.50 kg/m²      Physical Exam  Constitutional:       General: He is not in acute distress.     Appearance: Normal appearance. He is well-developed. He is not diaphoretic.   HENT:      Right Ear: Tympanic membrane and external ear normal.      Left Ear: External ear normal.      Ears:      Comments: Left TM is dull , red, there is yellowish dried secretions at the base of the ear canal.        Nose: Nose normal.      Mouth/Throat:      Pharynx: No oropharyngeal exudate.   Eyes:      General: No scleral icterus.        Right eye: No discharge.         Left eye: No discharge.      Conjunctiva/sclera: Conjunctivae normal.      Pupils: Pupils are equal, round, and reactive to light.   Neck:      Thyroid: No thyromegaly.      Vascular: No JVD.      Trachea: No tracheal deviation.   Cardiovascular:      Rate and Rhythm: Normal rate and regular rhythm.      Heart sounds: Normal heart sounds. No murmur heard.     No friction rub. No gallop.   Pulmonary:      Effort: Pulmonary effort is normal. No respiratory distress.      Breath sounds: Normal breath sounds. No wheezing or rales.   Chest:      Chest wall: No tenderness.   Abdominal:      General: Bowel sounds are normal. There is no distension.      Palpations: Abdomen is soft. There is no mass.      Tenderness: There is no abdominal  tenderness. There is no guarding.   Musculoskeletal:         General: No tenderness.      Cervical back: Normal range of motion and neck supple.   Lymphadenopathy:      Cervical: No cervical adenopathy.   Skin:     General: Skin is warm and dry.      Coloration: Skin is not pale.      Findings: No erythema or rash.   Neurological:      Mental Status: He is alert and oriented to person, place, and time.      Cranial Nerves: No cranial nerve deficit.      Motor: No abnormal muscle tone.      Coordination: Coordination normal.      Deep Tendon Reflexes: Reflexes normal.   Psychiatric:         Behavior: Behavior normal.         Thought Content: Thought content normal.         Judgment: Judgment normal.                ASSESSMENT/PLAN:   Nasal congestion  Takes antihistamines, still smokes , uses nasal steroids , referral to ENT    Essential hypertension, benign  Bp is controlled.     Acute serous otitis media of left ear  Biaxin 500 mg bid .     No orders of the defined types were placed in this encounter.      Meds This Visit:  Requested Prescriptions     Signed Prescriptions Disp Refills    clarithromycin 500 MG Oral Tab 20 tablet 0     Sig: Take 1 tablet (500 mg total) by mouth 2 (two) times daily.       Imaging & Referrals:  ENT - INTERNAL       ID#[id number]

## 2024-08-26 ENCOUNTER — TELEPHONE (OUTPATIENT)
Facility: LOCATION | Age: 49
End: 2024-08-26

## 2024-08-26 DIAGNOSIS — M51.26 DISPLACEMENT OF LUMBAR INTERVERTEBRAL DISC WITHOUT MYELOPATHY: ICD-10-CM

## 2024-08-26 RX ORDER — LEVOFLOXACIN 500 MG/1
500 TABLET, FILM COATED ORAL DAILY
Qty: 10 TABLET | Refills: 0 | Status: SHIPPED | OUTPATIENT
Start: 2024-08-26 | End: 2024-09-05

## 2024-08-26 NOTE — TELEPHONE ENCOUNTER
Patient requesting advice on possible additional antibiotic treatment    Spoke to patient, full name and date of birth verified.  Patient was seen 8/8/24 for left ear pain and Dr. Beatty prescribed     Acute serous otitis media of left ear  Biaxin 500 mg bid     Patient completed the 10 days of antibiotics, symptoms did not fully resolve.    For the past week, symptoms worsening again - NOT as bad as they were on 8/8/24.     Pain in left ear is \"6-7/10\" - patient usually sleeps on his left side, so it is waking him up.   Sometimes there is pain in the upper left jaw while eating, near the ear.   The left ear sometimes still feels \"clogged\" - intermittently.   Patient feels like there is some drainage into the throat.     Patient requests call back #[phone]

## 2024-08-26 NOTE — TELEPHONE ENCOUNTER
Please review.  Protocol failed/has no protocol.    Norco 7.5mg Recent fills each quantity 120 : 5/2, 6/3, 7/3, 8/2   DUE 9/2/24  Last prescription written: 8/1/24  Last office visit: 8/8/24

## 2024-08-26 NOTE — TELEPHONE ENCOUNTER
Patient requesting refill     [facility] #[id number] - [place], [place] - [address] AT [facility] [address]       Medication Detail    Medication Quantity Refills Start End   HYDROcodone-acetaminophen 7.5-325 MG Oral Tab 120 tablet 0 8/1/2024 --   Sig:   Take one every 6 to 8 hours as needed for pain not to exceed 5 tablets in 24 hours     Route:   (none)     Earliest Fill Date:   8/1/2024     Order #:   [id number]

## 2024-08-27 RX ORDER — HYDROCODONE BITARTRATE AND ACETAMINOPHEN 7.5; 325 MG/1; MG/1
TABLET ORAL
Qty: 120 TABLET | Refills: 0 | Status: SHIPPED | OUTPATIENT
Start: 2024-08-31

## 2024-09-06 ENCOUNTER — TELEPHONE (OUTPATIENT)
Dept: INTERNAL MEDICINE CLINIC | Facility: CLINIC | Age: 49
End: 2024-09-06

## 2024-09-06 NOTE — TELEPHONE ENCOUNTER
Dr. Beatty:     Patient states continues to have left ear pain even after finishing 2 different antibiotics; is using afrin  *ENT referral sent to patient via [employer]- will be calling ENT on Monday for an appointment     -felt like it was clearing up  -mild to moderate pain when chewing  -feels/sounds like what's to \"pop ear\"  -some \"discomfort/dull\" pain that comes and goes radiating from ear lobe down to neck   -can feel a \"bump\" to jaw (bony prominence) right by ear lobe which is not present right side (swollen lymph node as noted at 8/8/24 office visit)    Denies drainage, fever

## 2024-09-06 NOTE — TELEPHONE ENCOUNTER
Above noted, he needs to see ENT as discussed a month ago when the referral was placed and given to him .

## 2024-09-10 ENCOUNTER — OFFICE VISIT (OUTPATIENT)
Facility: LOCATION | Age: 49
End: 2024-09-10
Payer: COMMERCIAL

## 2024-09-10 VITALS — WEIGHT: 266 LBS | HEIGHT: 78 IN | BODY MASS INDEX: 30.78 KG/M2

## 2024-09-10 DIAGNOSIS — J34.89 NASAL OBSTRUCTION: ICD-10-CM

## 2024-09-10 DIAGNOSIS — J31.0 RHINITIS MEDICAMENTOSA: ICD-10-CM

## 2024-09-10 DIAGNOSIS — M26.622 ARTHRALGIA OF LEFT TEMPOROMANDIBULAR JOINT: ICD-10-CM

## 2024-09-10 DIAGNOSIS — R09.81 NASAL CONGESTION: ICD-10-CM

## 2024-09-10 DIAGNOSIS — T48.5X5A RHINITIS MEDICAMENTOSA: ICD-10-CM

## 2024-09-10 DIAGNOSIS — J34.3 HYPERTROPHY OF NASAL TURBINATES: ICD-10-CM

## 2024-09-10 DIAGNOSIS — J34.2 DEVIATED NASAL SEPTUM: Primary | ICD-10-CM

## 2024-09-10 RX ORDER — PREDNISONE 5 MG/1
TABLET ORAL
Qty: 35 TABLET | Refills: 0 | Status: SHIPPED | OUTPATIENT
Start: 2024-09-10 | End: 2024-09-24

## 2024-09-10 RX ORDER — FLUTICASONE PROPIONATE 50 MCG
2 SPRAY, SUSPENSION (ML) NASAL 2 TIMES DAILY
Qty: 16 G | Refills: 3 | Status: SHIPPED | OUTPATIENT
Start: 2024-09-10

## 2024-09-10 RX ORDER — AZELASTINE 1 MG/ML
2 SPRAY, METERED NASAL 2 TIMES DAILY
Qty: 30 ML | Refills: 3 | Status: SHIPPED | OUTPATIENT
Start: 2024-09-10

## 2024-09-10 RX ORDER — CELECOXIB 200 MG/1
200 CAPSULE ORAL DAILY PRN
Qty: 30 CAPSULE | Refills: 0 | Status: SHIPPED | OUTPATIENT
Start: 2024-09-10 | End: 2024-10-10

## 2024-09-11 NOTE — PROGRESS NOTES
[place]  OTOLARYNGOLOGY - HEAD & NECK SURGERY    9/10/2024     Reason for Consultation:   Ear pain, nasal congestion    History of Present Illness:   Patient is a pleasant 49 year old male who is being seen for left-sided ear pain over the past few weeks.  The patient states that he was originally treated for an ear infection by his primary care physician.  He states that he has had some improvement of his ear but still feels a fullness in his ear as well as pain especially when he sleeps on his left side.  He is also been having significant nasal congestion bilaterally.  He has had this for many years.  He is reliant on a over-the-counter nasal decongestant similar to Afrin.  He uses this every day.  He has not tried any other nasal sprays.  No history of any nasal surgery.  He does not have any significant facial pressure or pain.    Past Medical History  Past Medical History:    Abnormal glucose    High glucose    At high risk for altered glucose metabolism    Back pain    Spinal decompression    High cholesterol    Low blood pressure    Low HDL (under 40)    Sinusitis       Past Surgical History  Past Surgical History:   Procedure Laterality Date    Excis lesn,palate/uvula  1/4/2013    Procedure: EXCISIONAL BIOPSY MOUTH;  Surgeon: Abdias Bergeron MD;  [facility] [facility]       Family History  History reviewed. No pertinent family history.    Social History  Pediatric History   Patient Parents    Beatris Osborn (Mother)     Other Topics Concern     Service Not Asked    Blood Transfusions Not Asked    Caffeine Concern Yes     Comment: coffee, 2 cups daily    Occupational Exposure Not Asked    Hobby Hazards Not Asked    Sleep Concern Not Asked    Stress Concern Not Asked    Weight Concern Not Asked    Special Diet Not Asked    Back Care Not Asked    Exercise Not Asked    Bike Helmet Not Asked    Seat Belt Not Asked    Self-Exams Not Asked   Social History Narrative    Not on file            Current Medications:  Current Outpatient Medications   Medication Sig Dispense Refill    celecoxib 200 MG Oral Cap Take 1 capsule (200 mg total) by mouth daily as needed for Pain. 30 capsule 0    predniSONE 5 MG Oral Tab Take 4 tablets (20 mg total) by mouth daily for 5 days, THEN 2 tablets (10 mg total) daily for 5 days, THEN 1 tablet (5 mg total) daily for 5 days. 35 tablet 0    azelastine 0.1 % Nasal Solution 2 sprays by Nasal route 2 (two) times daily. 30 mL 3    fluticasone propionate 50 MCG/ACT Nasal Suspension 2 sprays by Nasal route 2 (two) times daily. 16 g 3    HYDROcodone-acetaminophen 7.5-325 MG Oral Tab Take one every 6 to 8 hours as needed for pain not to exceed 5 tablets in 24 hours 120 tablet 0    ALPRAZolam 0.5 MG Oral Tab Take 1 tablet (0.5 mg total) by mouth 2 (two) times daily as needed for Anxiety. 45 tablet 1    triamcinolone 0.1 % External Ointment APPLY TOPICALLY TO THE AFFECTED AREA TWICE DAILY AS NEEDED 30 g 5    allopurinol 100 MG Oral Tab Take 1 tablet (100 mg total) by mouth 2 (two) times daily. 60 tablet 11    melatonin 3 MG Oral Tab Take 1 tablet (3 mg total) by mouth nightly.      albuterol 108 (90 Base) MCG/ACT Inhalation Aero Soln Inhale 2 puffs into the lungs every 4 (four) hours as needed for Wheezing. 17 g 11    valACYclovir HCl 1 G Oral Tab Take 1 tablet (1,000 mg total) by mouth every 12 (twelve) hours. 21 tablet 3    Homeopathic Products (EAR PAIN RELIEF HOMEOPATHIC OT) Place in ear(s). (Patient not taking: Reported on 9/10/2024)      clarithromycin 500 MG Oral Tab Take 1 tablet (500 mg total) by mouth 2 (two) times daily. (Patient not taking: Reported on 9/10/2024) 20 tablet 0    SILDENAFIL CITRATE 100 MG Oral Tab TAKE 1 TABLET BY MOUTH AS NEEDED FOR ERECTILE DYSFUNCTION (Patient not taking: Reported on 9/10/2024) 24 tablet 3       Allergies  Allergies   Allergen Reactions    Kiwi Extract Tightness in Throat and SWELLING     Flushing in cheeks    Flushing in cheeks    Flushing in cheeks    Penicillins FEVER, ITCHING and HIVES       Review of Systems:   A comprehensive 10 point review of systems was completed.  Pertinent positives and negatives noted in the the HPI.    Physical Exam:   Height 6' 9\" (2.057 m), weight 266 lb (120.7 kg).    GENERAL: No acute distress, Comfortable appearing  FACE: HB 1/6, Normal Animation  HEAD: Normocephalic  EYES: EOMI, pupils equil  EARS: Bilateral Auricles Symmetric  NOSE: Nares patent bilaterally  ORAL CAVITY: Tongue mobile, Oropharynx clear, Floor of mouth clear, Posterior oropharynx normal  NECK: No palpable lymphadenopathy, thyroid not palpable, nontender    Canals:  Right: Clear  Left: Clear    Tympanic Membranes:  Right: Normal tympanic membrane, with no retraction, middle ear space clear  Left: Normal tympanic membrane. with no retraction middle ear space clear    TM Visualized Method:   Right TM examined via otomicroscopy.   Left TM examined via otomicroscopy.      PROCEDURE: BILATERAL RIGID NASAL ENDOSCOPY  Bilateral rigid nasal endoscopy (32246) was performed. Verbal consent was obtained from the patient to proceed with rigid nasal endoscopy.  A rigid 4mm 30 degree nasal endoscope was used to examine both nasal cavities. The inferior meatus, inferior turbinate, nasopharynx, middle meatus, middle turbinate, superior meatus, superior turbinate, and sphenoethmoidal recess were examined bilaterally and deemed to be normal, with any exceptions as noted below. At the completion of the procedure the endoscope was removed. The patient tolerated the procedure well. There were no complications.    Findings: The bilateral inferior turbinates were enlarged. The Septum was deviated to the left. The middle meatus was patent bilaterally without any pus drainage. There were no obvious masses or polyps noted.      Results:     Laboratory Data:  Lab Results   Component Value Date    WBC 8.5 03/16/2023    HGB 14.4 03/16/2023    HCT 43.4 03/16/2023    PLT  345 03/16/2023    CREATSERUM 0.80 03/16/2023    BUN 12 03/16/2023     03/16/2023    K 4.7 03/16/2023     03/16/2023    CO2 22 03/16/2023    GLU 78 03/16/2023    CA 9.9 03/16/2023    ALB 4.7 03/16/2023    ALKPHO 80 03/16/2023    TP 7.2 03/16/2023    AST 20 03/16/2023    ALT 17 03/16/2023    TSH 2.06 03/16/2023         Imaging:  No results found.      Impression:       ICD-10-CM    1. Deviated nasal septum  J34.2       2. Hypertrophy of nasal turbinates  J34.3       3. Nasal congestion  R09.81       4. Nasal obstruction  J34.89       5. Rhinitis medicamentosa  J31.0     T48.5X5A       6. Arthralgia of left temporomandibular joint  M26.622             Recommendations:  The patient appears to have rhinitis medicamentosa as he has been taking Afrin at least once daily on a regular basis.  I would like to start him on prednisone taper and switch him over to Flonase and azelastine in combination.  He will return to see me in 1 month for reevaluation.  If he continues to have significant nasal congestion we should consider septoplasty and turbinate reduction.  Additionally he has symptoms of left TMJ arthralgia.  I would like to start him on Celebrex to take for the next week and then as needed for jaw pain.    Thank you for allowing me to participate in the care of your patient.    Casper Rincon,    Otolaryngology/Rhinology, Sinus, and Endoscopic Skull Base Surgery  [facility] [address] [place] [id number]  Phone [phone]  Fax [phone]  9/10/2024  7:41 PM  9/10/2024

## 2024-09-16 DIAGNOSIS — F41.9 ANXIETY: ICD-10-CM

## 2024-09-17 DIAGNOSIS — M51.26 DISPLACEMENT OF LUMBAR INTERVERTEBRAL DISC WITHOUT MYELOPATHY: ICD-10-CM

## 2024-09-17 RX ORDER — HYDROCODONE BITARTRATE AND ACETAMINOPHEN 7.5; 325 MG/1; MG/1
TABLET ORAL
Qty: 120 TABLET | Refills: 0 | Status: SHIPPED | OUTPATIENT
Start: 2024-09-30

## 2024-09-19 DIAGNOSIS — F41.9 ANXIETY: ICD-10-CM

## 2024-09-20 RX ORDER — ALPRAZOLAM 0.5 MG
0.5 TABLET ORAL 2 TIMES DAILY PRN
Qty: 45 TABLET | Refills: 0 | OUTPATIENT
Start: 2024-09-20

## 2024-09-20 NOTE — TELEPHONE ENCOUNTER
Please Review. Protocol Failed; No Protocol   Alprazolam 0.5 MG   Sig:   Take 1 tablet (0.5 mg total) by mouth 2 (two) times daily as needed for Anxiety.   Last written 08/01/2024 for a quantity of 45 with 1 refill   Dispense History   08/26/2024 08/02/2024 07/07/2024  Last written 08/01/2024  Quantity 45   Patient is due 09/21/2024  Requested Prescriptions   Pending Prescriptions Disp Refills    ALPRAZolam 0.5 MG Oral Tab 45 tablet 1     Sig: Take 1 tablet (0.5 mg total) by mouth 2 (two) times daily as needed for Anxiety.       Controlled Substance Medication Failed - 9/19/2024  1:01 PM        Failed - This medication is a controlled substance - forward to provider to refill                 Recent Outpatient Visits              1 week ago Deviated nasal septum    [facility] Casper Rincon DO    Office Visit    1 month ago Essential hypertension, benign    [facility] Paulie Beatty MD    Office Visit    4 months ago Annual physical exam    [facility] Paulie Beatty MD    Office Visit    11 months ago Atypical chest pain    [facility] Paulie Beatty MD    Office Visit    1 year ago Displacement of lumbar intervertebral disc without myelopathy    [facility], [address], [place] Irena España, SHELLY    Telemedicine

## 2024-09-23 DIAGNOSIS — M51.26 DISPLACEMENT OF LUMBAR INTERVERTEBRAL DISC WITHOUT MYELOPATHY: ICD-10-CM

## 2024-09-23 DIAGNOSIS — F41.9 ANXIETY: ICD-10-CM

## 2024-09-23 NOTE — TELEPHONE ENCOUNTER
Due to Dr. Beatty out of office, routing to pod mate, please advise    Spoke to patient, full name and date of birth verified.  Patient calling to state he ran out of the alprazolam on Friday.     Last prescription 8/1/24 was for quantity 45, 1 refill.   Patient filled on 8/2/24 and 8/26/24.   Patient has been taking since at least 4/17/24.    New refill request came in today on same medication.   RN informed patient that request will be denied as duplicate.  Patient would appreciate a call back when prescription has been sent in.

## 2024-09-23 NOTE — TELEPHONE ENCOUNTER
Patient is requesting a refill on his hydrocodone medication. Pharmacy: [facility]/[place], [place] (Listed)     Current Outpatient Medications   Medication Sig Dispense Refill    [START ON 9/30/2024] HYDROcodone-acetaminophen 7.5-325 MG Oral Tab Take one every 6 to 8 hours as needed for pain not to exceed 5 tablets in 24 hours 120 tablet 0

## 2024-09-23 NOTE — TELEPHONE ENCOUNTER
Dr. Bright,     Please see messages below and advise, thank you    Routing to POD mate as Dr Beatty  is off until October 1st

## 2024-09-23 NOTE — TELEPHONE ENCOUNTER
Please see open alprazolam refill request dated 9/19/24.   Patient was informed this duplicate request would be denied and verbalized understanding.

## 2024-09-24 RX ORDER — ALPRAZOLAM 0.5 MG
0.5 TABLET ORAL 2 TIMES DAILY PRN
Qty: 30 TABLET | Refills: 0 | Status: SHIPPED | OUTPATIENT
Start: 2024-09-24

## 2024-09-27 RX ORDER — ALPRAZOLAM 0.5 MG
0.5 TABLET ORAL 2 TIMES DAILY PRN
Qty: 45 TABLET | Refills: 0 | OUTPATIENT
Start: 2024-09-27

## 2024-09-27 NOTE — TELEPHONE ENCOUNTER
Disp Refills Start End    ALPRAZolam 0.5 MG Oral Tab 30 tablet 0 9/24/2024 --    Sig - Route: Take 1 tablet (0.5 mg total) by mouth 2 (two) times daily as needed for Anxiety. - Oral    Sent to pharmacy as: ALPRAZolam 0.5 MG Oral Tablet (Xanax)    Notes to Pharmacy: On call MD only; further refills per pcp    E-Prescribing Status: Receipt confirmed by pharmacy (9/24/2024  7:55 AM CDT)      Associated Diagnoses    Anxiety        Pharmacy    [facility] #[id number] [address] AT [facility] OF [address], [phone], [phone]

## 2024-09-30 RX ORDER — HYDROCODONE BITARTRATE AND ACETAMINOPHEN 7.5; 325 MG/1; MG/1
TABLET ORAL
Qty: 120 TABLET | Refills: 0 | OUTPATIENT
Start: 2024-09-30

## 2024-10-09 DIAGNOSIS — F41.9 ANXIETY: ICD-10-CM

## 2024-10-11 RX ORDER — ALPRAZOLAM 0.5 MG
0.5 TABLET ORAL 2 TIMES DAILY PRN
Qty: 45 TABLET | Refills: 0 | Status: SHIPPED | OUTPATIENT
Start: 2024-10-11

## 2024-10-11 NOTE — TELEPHONE ENCOUNTER
Please review; protocol failed/ has no protocol      Recent fills: 09/24/2024,08/26/2024,08/02/2022  Last Rx written: 09/24/2024 15 day supply   Last Office Visit: 08/08/2024    Recent Visits  Date Type Provider Dept   08/08/24 Office Visit Paulie Beatty MD [facility]         Requested Prescriptions   Pending Prescriptions Disp Refills    ALPRAZolam 0.5 MG Oral Tab 30 tablet 0     Sig: Take 1 tablet (0.5 mg total) by mouth 2 (two) times daily as needed for Anxiety.       Controlled Substance Medication Failed - 10/11/2024 10:54 AM        Failed - This medication is a controlled substance - forward to provider to refill           Recent Outpatient Visits              1 month ago Deviated nasal septum    [facility] Casper Rincon DO    Office Visit    2 months ago Essential hypertension, benign    [facility] Paulie Beatty MD    Office Visit    4 months ago Annual physical exam    [facility] Paulie Beatty MD    Office Visit    1 year ago Atypical chest pain    [facility] Paulie Beatty MD    Office Visit    1 year ago Displacement of lumbar intervertebral disc without myelopathy    [facility], [address], [place] Irena España APRN    Telemedicine

## 2024-10-28 DIAGNOSIS — M51.26 DISPLACEMENT OF LUMBAR INTERVERTEBRAL DISC WITHOUT MYELOPATHY: ICD-10-CM

## 2024-10-30 RX ORDER — HYDROCODONE BITARTRATE AND ACETAMINOPHEN 7.5; 325 MG/1; MG/1
TABLET ORAL
Qty: 120 TABLET | Refills: 0 | Status: SHIPPED | OUTPATIENT
Start: 2024-10-30

## 2024-10-30 NOTE — TELEPHONE ENCOUNTER
Please review. Protocol Failed; No Protocol      Recent fills: 8/2/2024, 8/31/2024, 10/3/2024  Last Rx written: 9/17/2024  Last office visit: 8/8/2024          Requested Prescriptions   Pending Prescriptions Disp Refills    HYDROcodone-acetaminophen 7.5-325 MG Oral Tab 120 tablet 0     Sig: Take one every 6 to 8 hours as needed for pain not to exceed 5 tablets in 24 hours       Controlled Substance Medication Failed - 10/30/2024  3:47 PM        Failed - This medication is a controlled substance - forward to provider to refill                 Recent Outpatient Visits              1 month ago Deviated nasal septum    [facility] Casper Rincon DO    Office Visit    2 months ago Essential hypertension, benign    [facility] Paulie Beatty MD    Office Visit    5 months ago Annual physical exam    [facility] Paulie Beatty MD    Office Visit    1 year ago Atypical chest pain    [facility] Paulie Beatty MD    Office Visit    1 year ago Displacement of lumbar intervertebral disc without myelopathy    [facility], [address], [place], Irena MCCORMICK, APRN    Telemedicine

## 2024-11-05 DIAGNOSIS — F41.9 ANXIETY: ICD-10-CM

## 2024-11-11 RX ORDER — ALPRAZOLAM 0.5 MG
0.5 TABLET ORAL 2 TIMES DAILY PRN
Qty: 45 TABLET | Refills: 0 | Status: SHIPPED | OUTPATIENT
Start: 2024-11-11

## 2024-11-11 NOTE — TELEPHONE ENCOUNTER
Please review; protocol failed/ has no protocol      Recent fills: 10/14/2024,09/24/2024,08/26/2024  Last Rx written: 10/11/2024  Last Office Visit: 08/08/2024    Recent Visits  Date Type Provider Dept   08/08/24 Office Visit Paulie Beatty MD [facility]         Requested Prescriptions   Pending Prescriptions Disp Refills    ALPRAZolam 0.5 MG Oral Tab 45 tablet 0     Sig: Take 1 tablet (0.5 mg total) by mouth 2 (two) times daily as needed for Anxiety.       Controlled Substance Medication Failed - 11/11/2024 10:41 AM        Failed - This medication is a controlled substance - forward to provider to refill           Recent Outpatient Visits              2 months ago Deviated nasal septum    [facility] Casper Rincon DO    Office Visit    3 months ago Essential hypertension, benign    [facility] Paulie Beatty MD    Office Visit    5 months ago Annual physical exam    [facility] Paulie Beatty MD    Office Visit    1 year ago Atypical chest pain    [facility] Paulie Beatty MD    Office Visit    1 year ago Displacement of lumbar intervertebral disc without myelopathy    [facility] [place], [place] MCCORMICK, SHELLY    Telemedicine

## 2024-11-21 DIAGNOSIS — M51.26 DISPLACEMENT OF LUMBAR INTERVERTEBRAL DISC WITHOUT MYELOPATHY: ICD-10-CM

## 2024-11-26 DIAGNOSIS — M51.26 DISPLACEMENT OF LUMBAR INTERVERTEBRAL DISC WITHOUT MYELOPATHY: ICD-10-CM

## 2024-11-26 RX ORDER — HYDROCODONE BITARTRATE AND ACETAMINOPHEN 7.5; 325 MG/1; MG/1
TABLET ORAL
Qty: 120 TABLET | Refills: 0 | Status: SHIPPED | OUTPATIENT
Start: 2024-11-29

## 2024-11-26 RX ORDER — HYDROCODONE BITARTRATE AND ACETAMINOPHEN 7.5; 325 MG/1; MG/1
TABLET ORAL
Qty: 120 TABLET | Refills: 0 | OUTPATIENT
Start: 2024-11-26

## 2024-11-26 RX ORDER — ALBUTEROL SULFATE 90 UG/1
2 INHALANT RESPIRATORY (INHALATION) EVERY 4 HOURS PRN
Qty: 17 G | Refills: 11 | Status: SHIPPED | OUTPATIENT
Start: 2024-11-26

## 2024-11-26 NOTE — TELEPHONE ENCOUNTER
Please review; protocol failed/ has no protocol      Tyalor, [id number] minutes ago (1:01 PM)     BP  Patient is requesting a refill on his hydrocodone and albuterol inhaler. Pharmacy: [place], [place] (Listed)           Fill dates for Hydrocodone     Recent fills: 10/31/2024,10/03/2024,08/31/2024  Last Rx written: 10/30/2024  Last Office Visit: 08/08/2024    Recent Visits  Date Type Provider Dept   08/08/24 Office Visit Paulie Beatty MD [facility]         Requested Prescriptions   Pending Prescriptions Disp Refills    albuterol 108 (90 Base) MCG/ACT Inhalation Aero Soln 17 g 11     Sig: Inhale 2 puffs into the lungs every 4 (four) hours as needed for Wheezing.       Asthma & COPD Medication Protocol Failed - 11/26/2024  1:42 PM        Failed - ACT Score greater than or equal to 20                Failed - ACT recorded in the last 12 months                Passed - Appointment in past 6 or next 3 months      Recent Outpatient Visits              2 months ago Deviated nasal septum    [facility], [facility] Casper Rincon DO    Office Visit    3 months ago Essential hypertension, benign    [facility] Paulie Beatty MD    Office Visit    6 months ago Annual physical exam    [facility] Paulie Beatty MD    Office Visit    1 year ago Atypical chest pain    [facility] Paulie Beatty MD    Office Visit    1 year ago Displacement of lumbar intervertebral disc without myelopathy    [facility], [address], [place] Ierna España, APRN    Telemedicine                        HYDROcodone-acetaminophen 7.5-325 MG Oral Tab 120 tablet 0     Sig: Take one every 6 to 8 hours as needed for pain not to exceed 5 tablets in 24 hours       Controlled Substance Medication Failed - 11/26/2024  1:42 PM        Failed - This medication  is a controlled substance - forward to provider to refill           Recent Outpatient Visits              2 months ago Deviated nasal septum    [facility], [facility] Casper Rincon DO    Office Visit    3 months ago Essential hypertension, benign    [facility] Paulie Beatty MD    Office Visit    6 months ago Annual physical exam    [facility] Paulie Beatty MD    Office Visit    1 year ago Atypical chest pain    [facility] Paulie Beatty MD    Office Visit    1 year ago Displacement of lumbar intervertebral disc without myelopathy    [facility], [address], [place], Irena MCCORMICK, APRN    Telemedicine

## 2024-11-26 NOTE — TELEPHONE ENCOUNTER
Patient is requesting a refill on his hydrocodone and albuterol inhaler. Pharmacy: [facility]/[place], [place] (Listed)     Current Outpatient Medications   Medication Sig Dispense Refill    HYDROcodone-acetaminophen 7.5-325 MG Oral Tab Take one every 6 to 8 hours as needed for pain not to exceed 5 tablets in 24 hours 120 tablet 0    albuterol 108 (90 Base) MCG/ACT Inhalation Aero Soln Inhale 2 puffs into the lungs every 4 (four) hours as needed for Wheezing. 17 g 11

## 2024-12-06 DIAGNOSIS — F41.9 ANXIETY: ICD-10-CM

## 2024-12-06 NOTE — TELEPHONE ENCOUNTER
Patient called to request a refill on below medication.     [facility] on file verified.       Medication Quantity Refills Start End   ALPRAZolam 0.5 MG Oral Tab 45 tablet 0 11/11/2024 --   Sig:   Take 1 tablet (0.5 mg total) by mouth 2 (two) times daily as needed for Anxiety.     Route:   Oral     PRN Reason(s):   Anxiety     Order #:   [id number]

## 2024-12-12 RX ORDER — ALPRAZOLAM 0.5 MG
0.5 TABLET ORAL 2 TIMES DAILY PRN
Qty: 45 TABLET | Refills: 2 | Status: SHIPPED | OUTPATIENT
Start: 2024-12-12

## 2024-12-12 NOTE — TELEPHONE ENCOUNTER
Please review.  Protocol failed / Has no protocol.     Alprazolam 0.5mg Recent fills each # 45 : 8/26, 9/24 # 30, 10/14, 11/11  Last prescription written: 11/11/24  Last office visit: 8/8/24    Requested Prescriptions   Pending Prescriptions Disp Refills    ALPRAZolam 0.5 MG Oral Tab 45 tablet 0     Sig: Take 1 tablet (0.5 mg total) by mouth 2 (two) times daily as needed for Anxiety.       Controlled Substance Medication Failed - 12/11/2024  6:20 PM        Failed - This medication is a controlled substance - forward to provider to refill             Recent Outpatient Visits              3 months ago Deviated nasal septum    [facility] Casper Rincon DO    Office Visit    4 months ago Essential hypertension, benign    [facility] Paulie Garcia MD    Office Visit    6 months ago Annual physical exam    [facility] Paulie Beatty MD    Office Visit    1 year ago Atypical chest pain    [facility] Paulie Beatty MD    Office Visit    1 year ago Displacement of lumbar intervertebral disc without myelopathy    [facility], [address], [place], Irena MCCORMICK, APRN    Telemedicine

## 2024-12-26 DIAGNOSIS — M51.26 DISPLACEMENT OF LUMBAR INTERVERTEBRAL DISC WITHOUT MYELOPATHY: ICD-10-CM

## 2024-12-26 NOTE — TELEPHONE ENCOUNTER
Recent Fills: 10/03/2024, 10/31/2024, 11/30/2024-quantity 120 for 30 day supply-due 01/01/2025 based on day supply and dispense dates     Last Rx Written: 11/29/2024    Last Office Visit: 08/08/2024

## 2024-12-26 NOTE — TELEPHONE ENCOUNTER
Per patient he is running low on his HYDROcodone-acetaminophen and his Alprazolam and he would like to send to his pharmacy [facility]/ [facility] on file verified.    Current Outpatient Medications   Medication Sig Dispense Refill    ALPRAZolam 0.5 MG Oral Tab Take 1 tablet (0.5 mg total) by mouth 2 (two) times daily as needed for Anxiety. 45 tablet 2           HYDROcodone-acetaminophen 7.5-325 MG Oral Tab Take one every 6 to 8 hours as needed for pain not to exceed 5 tablets in 24 hours 120 tablet 0

## 2024-12-29 RX ORDER — HYDROCODONE BITARTRATE AND ACETAMINOPHEN 7.5; 325 MG/1; MG/1
TABLET ORAL
Qty: 120 TABLET | Refills: 0 | Status: SHIPPED | OUTPATIENT
Start: 2024-12-29

## 2025-01-24 DIAGNOSIS — M51.26 DISPLACEMENT OF LUMBAR INTERVERTEBRAL DISC WITHOUT MYELOPATHY: ICD-10-CM

## 2025-01-24 RX ORDER — HYDROCODONE BITARTRATE AND ACETAMINOPHEN 7.5; 325 MG/1; MG/1
TABLET ORAL
Qty: 120 TABLET | Refills: 0 | Status: SHIPPED | OUTPATIENT
Start: 2025-01-28

## 2025-01-24 NOTE — TELEPHONE ENCOUNTER
Patient is requesting a medication refill    Hydrocodone    [facility] in [facility] confirmed preferred

## 2025-01-24 NOTE — TELEPHONE ENCOUNTER
No Protocol For refill request.    Medication pended .  Requested Prescriptions     Pending Prescriptions Disp Refills    HYDROcodone-acetaminophen 7.5-325 MG Oral Tab 120 tablet 0     Sig: Take one every 6 to 8 hours as needed for pain not to exceed 5 tablets in 24 hours

## 2025-02-19 ENCOUNTER — NURSE TRIAGE (OUTPATIENT)
Dept: INTERNAL MEDICINE CLINIC | Facility: CLINIC | Age: 50
End: 2025-02-19

## 2025-02-19 DIAGNOSIS — F41.9 ANXIETY: ICD-10-CM

## 2025-02-19 NOTE — TELEPHONE ENCOUNTER
Please reply to pool: EM RN TRIAGE  Action Requested: Summary for Provider     []  Critical Lab, Recommendations Needed  [x] Need Additional Advice  []   FYI    []   Need Orders  [x] Need Medications Sent to Pharmacy  []  Other     SUMMARY: Patient contacts clinic requesting antibiotics for sinus congestion present x 2 days.  Denies fever or chest symptoms.  Ear feels congested.  Voice is audibly congested over the phone.  Home covid test negative.  Mild headache, vision is \"hazy\". Nurse advised appointment for evaluation.  Clinic visits offered, patient declines, it is too hard to come in with his work schedule.  He requests message be sent to physician and also covering provider as he has been prescribed antibiotics without being seen.  Nurse did advise patient that this will likely have to await Dr. Beatty review tomorrow.  He verbalized understanding.      Reason for call: Sinus Problem  Onset: Data Unavailable                       Reason for Disposition   Earache    Protocols used: Sinus Pain or Congestion-A-OH

## 2025-02-19 NOTE — TELEPHONE ENCOUNTER
Spoke to patient (verified Name and ) and relayed Dr. Cervantes's message. Patient verbalized understanding. He is willing to wait for PCP's response/recommendation. He will call back tomorrow to follow up.

## 2025-02-20 RX ORDER — CLARITHROMYCIN 500 MG/1
500 TABLET ORAL 2 TIMES DAILY
Qty: 20 TABLET | Refills: 0 | Status: SHIPPED | OUTPATIENT
Start: 2025-02-20

## 2025-02-24 RX ORDER — ALPRAZOLAM 0.5 MG
0.5 TABLET ORAL 2 TIMES DAILY PRN
Qty: 45 TABLET | Refills: 2 | Status: SHIPPED | OUTPATIENT
Start: 2025-02-24

## 2025-02-24 NOTE — TELEPHONE ENCOUNTER
No refill protocol / Rx failed. Please review pended order.     Recent fills each # 45 : 2/2/25  Last prescription written: 12/12/24  Last office visit:  8/8/2024    Future Appointments   Date Time Provider Department Center   3/4/2025 11:00 AM Paulie Beatty MD [facility]      Requested Prescriptions     Pending Prescriptions Disp Refills    ALPRAZolam 0.5 MG Oral Tab 45 tablet 2     Sig: Take 1 tablet (0.5 mg total) by mouth 2 (two) times daily as needed for Anxiety.     Sent [employer] message to patient the refill request was forwarded with provider.

## 2025-02-28 DIAGNOSIS — M51.26 DISPLACEMENT OF LUMBAR INTERVERTEBRAL DISC WITHOUT MYELOPATHY: ICD-10-CM

## 2025-02-28 NOTE — TELEPHONE ENCOUNTER
Recent Fills: 11/30/2024, 12/30/2024, 01/28/2025    Last Rx Written: 01/28/2025    Last Office Visit: 08/08/2024  Future Appointments   Date Time Provider Department Center   3/4/2025 11:00 AM Paulie Beatty MD [facility]

## 2025-02-28 NOTE — TELEPHONE ENCOUNTER
Patient called requesting a refill on the following medication:      HYDROcodone-acetaminophen 7.5-325 MG Oral Tab     Per patient please send the refill to the following pharmacy:    [facility]    [id number]    [facility]

## 2025-03-02 RX ORDER — HYDROCODONE BITARTRATE AND ACETAMINOPHEN 7.5; 325 MG/1; MG/1
TABLET ORAL
Qty: 120 TABLET | Refills: 0 | Status: SHIPPED | OUTPATIENT
Start: 2025-03-02

## 2025-03-04 ENCOUNTER — TELEPHONE (OUTPATIENT)
Facility: CLINIC | Age: 50
End: 2025-03-04

## 2025-03-04 ENCOUNTER — LAB ENCOUNTER (OUTPATIENT)
Dept: LAB | Age: 50
End: 2025-03-04
Attending: INTERNAL MEDICINE
Payer: COMMERCIAL

## 2025-03-04 ENCOUNTER — OFFICE VISIT (OUTPATIENT)
Dept: INTERNAL MEDICINE CLINIC | Facility: CLINIC | Age: 50
End: 2025-03-04

## 2025-03-04 VITALS
DIASTOLIC BLOOD PRESSURE: 70 MMHG | OXYGEN SATURATION: 99 % | TEMPERATURE: 99 F | BODY MASS INDEX: 30.66 KG/M2 | HEIGHT: 78 IN | SYSTOLIC BLOOD PRESSURE: 100 MMHG | HEART RATE: 86 BPM | WEIGHT: 265 LBS

## 2025-03-04 DIAGNOSIS — M54.40 CHRONIC RIGHT-SIDED LOW BACK PAIN WITH SCIATICA, SCIATICA LATERALITY UNSPECIFIED: ICD-10-CM

## 2025-03-04 DIAGNOSIS — G89.29 CHRONIC RIGHT-SIDED LOW BACK PAIN WITH SCIATICA, SCIATICA LATERALITY UNSPECIFIED: ICD-10-CM

## 2025-03-04 DIAGNOSIS — Z12.11 COLON CANCER SCREENING: ICD-10-CM

## 2025-03-04 DIAGNOSIS — Z00.00 ANNUAL PHYSICAL EXAM: Primary | ICD-10-CM

## 2025-03-04 DIAGNOSIS — Z00.00 ANNUAL PHYSICAL EXAM: ICD-10-CM

## 2025-03-04 DIAGNOSIS — I10 ESSENTIAL HYPERTENSION, BENIGN: ICD-10-CM

## 2025-03-04 LAB
ALBUMIN SERPL-MCNC: 4.7 G/DL (ref 3.2–4.8)
ALBUMIN/GLOB SERPL: 1.7 {RATIO} (ref 1–2)
ALP LIVER SERPL-CCNC: 86 U/L
ALT SERPL-CCNC: 13 U/L
ANION GAP SERPL CALC-SCNC: 5 MMOL/L (ref 0–18)
AST SERPL-CCNC: 18 U/L (ref ?–34)
BASOPHILS # BLD AUTO: 0.09 X10(3) UL (ref 0–0.2)
BASOPHILS NFR BLD AUTO: 1.2 %
BILIRUB SERPL-MCNC: 0.7 MG/DL (ref 0.3–1.2)
BILIRUB UR QL: NEGATIVE
BUN BLD-MCNC: 11 MG/DL (ref 9–23)
BUN/CREAT SERPL: 12.4 (ref 10–20)
CALCIUM BLD-MCNC: 9.6 MG/DL (ref 8.7–10.4)
CHLORIDE SERPL-SCNC: 107 MMOL/L (ref 98–112)
CHOLEST SERPL-MCNC: 162 MG/DL (ref ?–200)
CLARITY UR: CLEAR
CO2 SERPL-SCNC: 28 MMOL/L (ref 21–32)
COLOR UR: YELLOW
CREAT BLD-MCNC: 0.89 MG/DL
DEPRECATED RDW RBC AUTO: 41.6 FL (ref 35.1–46.3)
EGFRCR SERPLBLD CKD-EPI 2021: 105 ML/MIN/1.73M2 (ref 60–?)
EOSINOPHIL # BLD AUTO: 0.12 X10(3) UL (ref 0–0.7)
EOSINOPHIL NFR BLD AUTO: 1.6 %
ERYTHROCYTE [DISTWIDTH] IN BLOOD BY AUTOMATED COUNT: 12.6 % (ref 11–15)
EST. AVERAGE GLUCOSE BLD GHB EST-MCNC: 117 MG/DL (ref 68–126)
FASTING PATIENT LIPID ANSWER: YES
FASTING STATUS PATIENT QL REPORTED: YES
GLOBULIN PLAS-MCNC: 2.7 G/DL (ref 2–3.5)
GLUCOSE BLD-MCNC: 93 MG/DL (ref 70–99)
GLUCOSE UR-MCNC: NORMAL MG/DL
HBA1C MFR BLD: 5.7 % (ref ?–5.7)
HCT VFR BLD AUTO: 40.6 %
HDLC SERPL-MCNC: 34 MG/DL (ref 40–59)
HGB BLD-MCNC: 13.9 G/DL
HGB UR QL STRIP.AUTO: NEGATIVE
IMM GRANULOCYTES # BLD AUTO: 0.02 X10(3) UL (ref 0–1)
IMM GRANULOCYTES NFR BLD: 0.3 %
KETONES UR-MCNC: NEGATIVE MG/DL
LDLC SERPL CALC-MCNC: 110 MG/DL (ref ?–100)
LEUKOCYTE ESTERASE UR QL STRIP.AUTO: NEGATIVE
LYMPHOCYTES # BLD AUTO: 1.21 X10(3) UL (ref 1–4)
LYMPHOCYTES NFR BLD AUTO: 15.7 %
MCH RBC QN AUTO: 30.9 PG (ref 26–34)
MCHC RBC AUTO-ENTMCNC: 34.2 G/DL (ref 31–37)
MCV RBC AUTO: 90.2 FL
MONOCYTES # BLD AUTO: 0.8 X10(3) UL (ref 0.1–1)
MONOCYTES NFR BLD AUTO: 10.4 %
NEUTROPHILS # BLD AUTO: 5.47 X10 (3) UL (ref 1.5–7.7)
NEUTROPHILS # BLD AUTO: 5.47 X10(3) UL (ref 1.5–7.7)
NEUTROPHILS NFR BLD AUTO: 70.8 %
NITRITE UR QL STRIP.AUTO: NEGATIVE
NONHDLC SERPL-MCNC: 128 MG/DL (ref ?–130)
OSMOLALITY SERPL CALC.SUM OF ELEC: 289 MOSM/KG (ref 275–295)
PH UR: 7 [PH] (ref 5–8)
PLATELET # BLD AUTO: 295 10(3)UL (ref 150–450)
POTASSIUM SERPL-SCNC: 4.4 MMOL/L (ref 3.5–5.1)
PROT SERPL-MCNC: 7.4 G/DL (ref 5.7–8.2)
PROT UR-MCNC: NEGATIVE MG/DL
RBC # BLD AUTO: 4.5 X10(6)UL
SODIUM SERPL-SCNC: 140 MMOL/L (ref 136–145)
SP GR UR STRIP: 1.02 (ref 1–1.03)
T4 FREE SERPL-MCNC: 1.1 NG/DL (ref 0.8–1.7)
TRIGL SERPL-MCNC: 94 MG/DL (ref 30–149)
TSI SER-ACNC: 1.3 UIU/ML (ref 0.55–4.78)
UROBILINOGEN UR STRIP-ACNC: 2
VLDLC SERPL CALC-MCNC: 16 MG/DL (ref 0–30)
WBC # BLD AUTO: 7.7 X10(3) UL (ref 4–11)

## 2025-03-04 PROCEDURE — 36415 COLL VENOUS BLD VENIPUNCTURE: CPT

## 2025-03-04 PROCEDURE — 84443 ASSAY THYROID STIM HORMONE: CPT

## 2025-03-04 PROCEDURE — 80061 LIPID PANEL: CPT

## 2025-03-04 PROCEDURE — 84439 ASSAY OF FREE THYROXINE: CPT

## 2025-03-04 PROCEDURE — 80053 COMPREHEN METABOLIC PANEL: CPT

## 2025-03-04 PROCEDURE — 81003 URINALYSIS AUTO W/O SCOPE: CPT | Performed by: INTERNAL MEDICINE

## 2025-03-04 PROCEDURE — 85025 COMPLETE CBC W/AUTO DIFF WBC: CPT

## 2025-03-04 PROCEDURE — 83036 HEMOGLOBIN GLYCOSYLATED A1C: CPT | Performed by: INTERNAL MEDICINE

## 2025-03-04 PROCEDURE — 99396 PREV VISIT EST AGE 40-64: CPT | Performed by: INTERNAL MEDICINE

## 2025-03-04 NOTE — ASSESSMENT & PLAN NOTE
This is chronic , he has had a MRI of the spine at Dr Mckeon's office , ore than 5 years ago . MRI ordered. See physiatry , if neurosurgery eval needed , referral placed.

## 2025-03-04 NOTE — TELEPHONE ENCOUNTER
Spoke with patient and verified date of birth.     Reviewed indication to schedule telephone colon screening appointment.     Confirmed date, time and details for phone screening. Verified best contact number, 2-part GI questionnaire, and no active GI symptoms.     Verbalized understanding and appreciative for call.     Future Appointments   Date Time Provider Department Center   3/10/2025 12:00 PM GI COLON SCREENING [id number] None

## 2025-03-04 NOTE — H&P
HPI:    Patient ID: Jordon Mendes is a 49 year old male.    HPIpatient is here for his annual physical  Only complaint is low back pain , right , radiates to leg.   He has seen ortho in the past, had a MRI , ( has has had several) , he had epidural steroids at [facility] , No relief of pain , He also had physical therapy .   He has been on chronic Norco .   Would like to re evaluate his back and have him see physiatry .     Past Medical History:    Abnormal glucose    High glucose    At high risk for altered glucose metabolism    Back pain    Spinal decompression    High cholesterol    Low blood pressure    Low HDL (under 40)    Sinusitis       Past Surgical History:   Procedure Laterality Date    Excis lesn,palate/uvula  2013    Procedure: EXCISIONAL BIOPSY MOUTH;  Surgeon: Abdias Bergeron MD;  Location: [facility]       Social History     Tobacco Use    Smoking status: Some Days     Current packs/day: 0.00     Average packs/day: 1 pack/day for 10.0 years (10.0 ttl pk-yrs)     Types: Cigarettes     Start date: 10/1/2013     Last attempt to quit: 10/1/2023     Years since quittin.4    Smokeless tobacco: Never   Substance Use Topics    Alcohol use: Not Currently       No family history on file.      ROS:   Review of Systems   Constitutional: Negative.    HENT: Negative.     Eyes: Negative.    Respiratory: Negative.     Cardiovascular:  Negative for chest pain and leg swelling.   Gastrointestinal: Negative.    Endocrine: Negative.    Genitourinary:  Negative for decreased urine volume, difficulty urinating, dysuria, flank pain, frequency, hematuria and urgency.   Musculoskeletal:  Positive for back pain. Negative for arthralgias, gait problem, joint swelling and myalgias.   Skin:  Negative for color change, rash and wound.   Allergic/Immunologic: Negative.    Neurological: Negative.  Negative for headaches.   Psychiatric/Behavioral:  Negative for agitation and confusion. The patient is not  nervous/anxious.             Current Outpatient Medications   Medication Sig Dispense Refill    HYDROcodone-acetaminophen 7.5-325 MG Oral Tab Take one every 6 to 8 hours as needed for pain not to exceed 5 tablets in 24 hours 120 tablet 0    clarithromycin 500 MG Oral Tab Take 1 tablet (500 mg total) by mouth 2 (two) times daily. 20 tablet 0    allopurinol 100 MG Oral Tab Take 1 tablet (100 mg total) by mouth 2 (two) times daily. 60 tablet 11    melatonin 3 MG Oral Tab Take 1 tablet (3 mg total) by mouth nightly.      ALPRAZolam 0.5 MG Oral Tab Take 1 tablet (0.5 mg total) by mouth 2 (two) times daily as needed for Anxiety. 45 tablet 2    albuterol 108 (90 Base) MCG/ACT Inhalation Aero Soln Inhale 2 puffs into the lungs every 4 (four) hours as needed for Wheezing. 17 g 11    azelastine 0.1 % Nasal Solution 2 sprays by Nasal route 2 (two) times daily. 30 mL 3    fluticasone propionate 50 MCG/ACT Nasal Suspension 2 sprays by Nasal route 2 (two) times daily. 16 g 3    Homeopathic Products (EAR PAIN RELIEF HOMEOPATHIC OT) Place in ear(s). (Patient not taking: Reported on 9/10/2024)      triamcinolone 0.1 % External Ointment APPLY TOPICALLY TO THE AFFECTED AREA TWICE DAILY AS NEEDED 30 g 5    SILDENAFIL CITRATE 100 MG Oral Tab TAKE 1 TABLET BY MOUTH AS NEEDED FOR ERECTILE DYSFUNCTION (Patient not taking: Reported on 9/10/2024) 24 tablet 3    valACYclovir HCl 1 G Oral Tab Take 1 tablet (1,000 mg total) by mouth every 12 (twelve) hours. 21 tablet 3     Allergies:Allergies[1]   PHYSICAL EXAM:   /70   Pulse 86   Temp 98.5 °F (36.9 °C)   Ht 6' 9\" (2.057 m)   Wt 265 lb (120.2 kg)   SpO2 99%   BMI 28.40 kg/m²     Physical Exam  Constitutional:       General: He is not in acute distress.     Appearance: Normal appearance. He is well-developed. He is not diaphoretic.   HENT:      Right Ear: Tympanic membrane, ear canal and external ear normal. There is no impacted cerumen.      Left Ear: Tympanic membrane, ear canal  and external ear normal. There is no impacted cerumen.      Nose: Nose normal.      Mouth/Throat:      Mouth: Mucous membranes are dry.      Pharynx: Oropharynx is clear. No oropharyngeal exudate.   Eyes:      General: No scleral icterus.        Right eye: No discharge.         Left eye: No discharge.      Conjunctiva/sclera: Conjunctivae normal.      Pupils: Pupils are equal, round, and reactive to light.   Neck:      Thyroid: No thyromegaly.      Vascular: No JVD.      Trachea: No tracheal deviation.   Cardiovascular:      Rate and Rhythm: Normal rate and regular rhythm.      Heart sounds: Normal heart sounds. No murmur heard.     No friction rub. No gallop.   Pulmonary:      Effort: Pulmonary effort is normal. No respiratory distress.      Breath sounds: Normal breath sounds. No wheezing or rales.   Chest:      Chest wall: No tenderness.   Abdominal:      General: Bowel sounds are normal. There is no distension.      Palpations: Abdomen is soft. There is no mass.      Tenderness: There is no abdominal tenderness. There is no guarding or rebound.      Hernia: No hernia is present. There is no hernia in the left inguinal area or right inguinal area.   Genitourinary:     Pubic Area: No rash.       Penis: Normal and circumcised.       Testes: Normal. Cremasteric reflex is present.      Epididymis:      Right: Normal.      Left: Normal.      Forrest stage (genital): 5.   Musculoskeletal:         General: No tenderness.      Cervical back: Normal range of motion and neck supple.   Lymphadenopathy:      Cervical: No cervical adenopathy.      Lower Body: No right inguinal adenopathy. No left inguinal adenopathy.   Skin:     General: Skin is warm and dry.      Coloration: Skin is not pale.      Findings: No erythema or rash.   Neurological:      Mental Status: He is alert and oriented to person, place, and time.      Cranial Nerves: No cranial nerve deficit.      Motor: No abnormal muscle tone.      Coordination:  Coordination normal.      Deep Tendon Reflexes: Reflexes normal.   Psychiatric:         Mood and Affect: Mood normal.         Behavior: Behavior normal.         Thought Content: Thought content normal.         Judgment: Judgment normal.                ASSESSMENT/PLAN:   Right-sided low back pain with sciatica  This is chronic , he has had a MRI of the spine at Dr Mckeon's office , ore than 5 years ago . MRI ordered. See physiatry , if neurosurgery eval needed , referral placed.      Essential hypertension, benign  Bp is controlled.     Colon cancer screening  Order given again , importance stressed .     Annual physical exam  Physical today   Labs today   C scope   STOP SMOKING     Orders Placed This Encounter   Procedures    CBC With Differential With Platelet    Comp Metabolic Panel (14)    Hemoglobin A1C    Lipid Panel    Assay, Thyroid Stim Hormone    Free T4, (Free Thyroxine)    Urinalysis, Routine       Meds This Visit:  Requested Prescriptions      No prescriptions requested or ordered in this encounter       Imaging & Referrals:  PHYSIATRY - INTERNAL  NEUROSURGERY - INTERNAL  OP REFERRAL TO [facility] TELEPHONE COLON SCREEN  MRI SPINE LUMBAR (CPT=72148)       ID#[id number]       [1]   Allergies  Allergen Reactions    Kiwi Extract Tightness in Throat and SWELLING     Flushing in cheeks    Flushing in cheeks   Flushing in cheeks    Penicillins FEVER, ITCHING and HIVES

## 2025-03-10 ENCOUNTER — NURSE ONLY (OUTPATIENT)
Facility: CLINIC | Age: 50
End: 2025-03-10

## 2025-03-10 DIAGNOSIS — Z12.11 COLON CANCER SCREENING: Primary | ICD-10-CM

## 2025-03-10 RX ORDER — POLYETHYLENE GLYCOL 3350, SODIUM CHLORIDE, SODIUM BICARBONATE, POTASSIUM CHLORIDE 420; 11.2; 5.72; 1.48 G/4L; G/4L; G/4L; G/4L
POWDER, FOR SOLUTION ORAL
Qty: 1 EACH | Refills: 0 | Status: SHIPPED | OUTPATIENT
Start: 2025-03-10

## 2025-03-10 NOTE — PROGRESS NOTES
Krystyna -  Called patient for scheduled telephone colon screen.   Please advise on colonoscopy and bowel prep orders.   Medications, pharmacy, and allergies reviewed.     Age 45-74 y/o: Yes  › MD preference: None  › Insurance:  [facility]  › Last PCP visit: Dr. Beatty 3/4/25  › Last CBC: 3/4/25  › Date of positive FIT (if applicable): N/A  › H/W/BMI: 6'9 / 265 lb / 28.41 BMI    Special comments/notes: None  Telephone Colon Screening Questionnaire Yes No   Are you currently experiencing any GI symptoms [] [x]   If yes, explain:     Rectal bleeding [] [x]   Black stool [] [x]   Dysphagia or food \"feeling stuck\" when eating [] [x]   Intractable vomiting [] [x]   Unexplained weight loss [] [x]   First colonoscopy [x] []   Family history of colon cancer [] [x]   Any issues with anesthesia [] [x]   If yes, explain:      Any recent complaints related to chest pain &/or shortness of breath [] [x]   Referred to a cardiologist?  [] [x]   If yes, explain:      History of respiratory issues/oxygen/KAYE/COPD - asthma [x] []   CPAP/BiPAP:     History of devices (pacemaker/defibrillator) [] [x]   History of heart attack &/or stroke - mild October 2023 - elevated troponin - due to stress [] [x]   If yes, in the last 12 months? Stent placement?  [] [x]     Medication Reconciliation  Yes  No   Anticoagulants (except Aspirin) [] [x]   Diabetic Medication [] [x]   Weight loss medication (phentermine/vyvanse/saxsenda/etc) [] [x]   Iron/herbal/multivitamin supplement(s) - vitamin c, zinc, magnesium citrate gummies  [] []   Usage of marijuana, CBD &/or vape product(s) - occ  [x] []

## 2025-03-10 NOTE — PROGRESS NOTES
Please schedule with next available provider  Reason: crc screening  Prep: trilyte  Sedation: MAC  Medications: Hold sildenafil 72 hours prior.         Krystyna Aiken PA-C

## 2025-03-11 NOTE — PROGRESS NOTES
Scheduled for:  Colonoscopy 25305  Provider Name: Dr. Canseco   Date:    Location:[facility]  Sedation:  MAC  Time:  (Patient is aware that endo/eosc will call with arrival time )  Prep:  Trilyte Prep Instructions Given At The Office Visit/ Mychart  Meds/Allergies Reconciled?: Physician Reviewed   Diagnosis with codes:  COLON SCREENING Z12.11  Was patient informed to call insurance with codes (Y/N):  Yes  Referral sent?:  Referral was sent at the time of electronic surgical scheduling.  [facility] or [facility] notified?:  I sent an electronic request to Endo Scheduling and received a confirmation today.  Medication Orders:  PATIENT IS AWARE TO Hold sildenafil 72 hours prior. Pt is aware to NOT take iron pills, herbal meds and diet supplements for 7 days before exam. Also to NOT take any form of alcohol, recreational drugs and any forms of ED meds 24 hours before exam.   Misc Orders:       Further instructions given by staff:  I provide prep instructions to patient MYCHART and reviewed date, and location, HE verbalized that HE understood and is aware to call if HE has any questions.

## 2025-03-26 ENCOUNTER — TELEPHONE (OUTPATIENT)
Dept: INTERNAL MEDICINE CLINIC | Facility: CLINIC | Age: 50
End: 2025-03-26

## 2025-03-26 DIAGNOSIS — M51.26 DISPLACEMENT OF LUMBAR INTERVERTEBRAL DISC WITHOUT MYELOPATHY: ICD-10-CM

## 2025-03-26 NOTE — TELEPHONE ENCOUNTER
Patient wants a refill of:    Hydrocodone    Send to verified:  Preferred Pharmacy    [id number] - [place], [place] - [address] [facility] [address], [phone], [phone] [address] [facility] [id number]

## 2025-03-26 NOTE — TELEPHONE ENCOUNTER
Patient was notified we need to resend his annual physical form to his employer, this time including the Nicotine piece.    The form has the fax# on it.    Leonardo patient with questions.

## 2025-03-31 RX ORDER — HYDROCODONE BITARTRATE AND ACETAMINOPHEN 7.5; 325 MG/1; MG/1
TABLET ORAL
Qty: 120 TABLET | Refills: 0 | Status: SHIPPED | OUTPATIENT
Start: 2025-04-01

## 2025-03-31 NOTE — TELEPHONE ENCOUNTER
Please Review. Protocol Failed; No Protocol   03/02/2025  LAST WRITTEN: 03/02/2025  Quantity: 120    Recent Visits  Date Type Provider Dept   03/04/25 Office Visit Paulie Beatty MD [facility]

## 2025-04-01 ENCOUNTER — MED REC SCAN ONLY (OUTPATIENT)
Dept: INTERNAL MEDICINE CLINIC | Facility: CLINIC | Age: 50
End: 2025-04-01

## 2025-04-22 ENCOUNTER — PATIENT MESSAGE (OUTPATIENT)
Facility: LOCATION | Age: 50
End: 2025-04-22

## 2025-04-22 DIAGNOSIS — F41.9 ANXIETY: ICD-10-CM

## 2025-04-22 RX ORDER — CELECOXIB 200 MG/1
200 CAPSULE ORAL DAILY PRN
Qty: 30 CAPSULE | Refills: 0 | OUTPATIENT
Start: 2025-04-22

## 2025-04-22 NOTE — TELEPHONE ENCOUNTER
[employer] message sent to Jordon already recommending he call the office to schedule an appt. He was last seen in Sept 2024.

## 2025-04-25 RX ORDER — ALPRAZOLAM 0.5 MG
0.5 TABLET ORAL 2 TIMES DAILY PRN
Qty: 45 TABLET | Refills: 0 | OUTPATIENT
Start: 2025-04-25

## 2025-04-28 DIAGNOSIS — M51.26 DISPLACEMENT OF LUMBAR INTERVERTEBRAL DISC WITHOUT MYELOPATHY: ICD-10-CM

## 2025-04-28 NOTE — TELEPHONE ENCOUNTER
Patient requesting refill     [facility] #[id number] - [facility] [address] [facility] [address],     Medication Detail    Medication Quantity Refills Start End   HYDROcodone-acetaminophen 7.5-325 MG Oral Tab 120 tablet 0 4/1/2025 --   Sig:   Take one every 6 to 8 hours as needed for pain not to exceed 5 tablets in 24 hours     Route:   (none)     Earliest Fill Date:   4/1/2025     Order #:   [id number]

## 2025-04-30 RX ORDER — HYDROCODONE BITARTRATE AND ACETAMINOPHEN 7.5; 325 MG/1; MG/1
TABLET ORAL
Qty: 120 TABLET | Refills: 0 | Status: SHIPPED | OUTPATIENT
Start: 2025-05-01

## 2025-04-30 NOTE — TELEPHONE ENCOUNTER
Please review; protocol failed/No Protocol      Recent Fills: 01/28/2025, 03/02/2025, 04/01/2025 #120 for 30 day supply     Last Rx Written: 03/31/2025    Last Office Visit: 03/04/2025

## 2025-05-05 ENCOUNTER — HOSPITAL ENCOUNTER (OUTPATIENT)
Dept: MRI IMAGING | Facility: HOSPITAL | Age: 50
Discharge: HOME | End: 2025-05-05
Attending: INTERNAL MEDICINE
Payer: COMMERCIAL

## 2025-05-05 DIAGNOSIS — M54.40 CHRONIC RIGHT-SIDED LOW BACK PAIN WITH SCIATICA, SCIATICA LATERALITY UNSPECIFIED: ICD-10-CM

## 2025-05-05 DIAGNOSIS — G89.29 CHRONIC RIGHT-SIDED LOW BACK PAIN WITH SCIATICA, SCIATICA LATERALITY UNSPECIFIED: ICD-10-CM

## 2025-05-05 PROCEDURE — 72148 MRI LUMBAR SPINE W/O DYE: CPT | Performed by: INTERNAL MEDICINE

## 2025-05-20 DIAGNOSIS — F41.9 ANXIETY: ICD-10-CM

## 2025-05-22 RX ORDER — TRIAMCINOLONE ACETONIDE 1 MG/G
OINTMENT TOPICAL
Qty: 80 G | Refills: 1 | Status: SHIPPED | OUTPATIENT
Start: 2025-05-22

## 2025-05-22 RX ORDER — ALPRAZOLAM 0.5 MG
0.5 TABLET ORAL 2 TIMES DAILY PRN
Qty: 45 TABLET | Refills: 2 | Status: SHIPPED | OUTPATIENT
Start: 2025-05-22

## 2025-05-22 NOTE — TELEPHONE ENCOUNTER
Please review: medication fails/has no protocol attached.    Future Appointments   Date Time Provider Department Center   7/15/2025  4:00 PM Rajinder Edwards MD [id number]     Recent fill dates: 4/22/25, 3/29/25, and 2/25/25  Date of  last written prescription: 2/24/25   Last dispensed quantity: #45 each and processed as a 22 day supply  Last office visit: 3/4/25  [x] Takes as needed  [] Takes scheduled daily     General

## 2025-05-27 DIAGNOSIS — M51.26 DISPLACEMENT OF LUMBAR INTERVERTEBRAL DISC WITHOUT MYELOPATHY: ICD-10-CM

## 2025-05-27 RX ORDER — HYDROCODONE BITARTRATE AND ACETAMINOPHEN 7.5; 325 MG/1; MG/1
TABLET ORAL
Qty: 120 TABLET | Refills: 0 | Status: SHIPPED | OUTPATIENT
Start: 2025-05-31

## 2025-05-27 NOTE — TELEPHONE ENCOUNTER
Patient calling to request refill, verified [facility] in [place], [place].    HYDROcodone-acetaminophen 7.5-325 MG Oral Tab Take one every 6 to 8 hours as needed for pain not to exceed 5 tablets in 24 hours 120 tablet 0

## 2025-05-27 NOTE — TELEPHONE ENCOUNTER
Please review; protocol failed/ has no protocol        Recent fills: 05/01/2025,04/01/2025,03/02/2025  Last Rx written: 04/30/2025  Last Office Visit: 03/04/2025    Recent Visits  Date Type Provider Dept   03/04/25 Office Visit Paulie Beatty MD [facility]

## 2025-06-18 NOTE — TELEPHONE ENCOUNTER
Please Review. Protocol Failed; No Protocol     Requested Prescriptions   Pending Prescriptions Disp Refills    ALLOPURINOL 100 MG Oral Tab [Pharmacy Med Name: ALLOPURINOL 100MG TABLETS] 60 tablet 11     Sig: TAKE 1 TABLET(100 MG) BY MOUTH TWICE DAILY       There is no refill protocol information for this order

## 2025-06-19 RX ORDER — ALLOPURINOL 100 MG/1
100 TABLET ORAL 2 TIMES DAILY
Qty: 180 TABLET | Refills: 3 | Status: SHIPPED | OUTPATIENT
Start: 2025-06-19

## 2025-06-23 DIAGNOSIS — M51.26 DISPLACEMENT OF LUMBAR INTERVERTEBRAL DISC WITHOUT MYELOPATHY: ICD-10-CM

## 2025-06-23 NOTE — TELEPHONE ENCOUNTER
Patient requesting refill       [facility] #[id number] - [facility] [address] [facility] [address]     Medication Detail    Medication Quantity Refills Start End   HYDROcodone-acetaminophen 7.5-325 MG Oral Tab 120 tablet 0 5/31/2025 --   Sig:   Take one every 6 to 8 hours as needed for pain not to exceed 5 tablets in 24 hours     Route:   (none)     Earliest Fill Date:   5/31/2025     Order #:   [id number]

## 2025-06-27 RX ORDER — HYDROCODONE BITARTRATE AND ACETAMINOPHEN 7.5; 325 MG/1; MG/1
TABLET ORAL
Qty: 120 TABLET | Refills: 0 | Status: SHIPPED | OUTPATIENT
Start: 2025-06-30

## 2025-06-27 NOTE — TELEPHONE ENCOUNTER
For replies, please route to pool: [facility] CENTRAL REFILLS    Please review: medication fails/has no protocol attached.    Future Appointments   Date Time Provider Department Center   7/15/2025  4:00 PM Rajinder Edwards MD [id number]     Last office visit: 3/4/25    Recent fill dates: 5/31/25, 5/1/25, and 4/1/25  Date of  last written prescription: 5/27/25   Last dispensed quantity: #120 each and processed as a 30 day supply

## 2025-07-15 ENCOUNTER — SPINE CENTER NAVIGATION (OUTPATIENT)
Age: 50
End: 2025-07-15

## 2025-07-15 ENCOUNTER — OFFICE VISIT (OUTPATIENT)
Age: 50
End: 2025-07-15
Payer: COMMERCIAL

## 2025-07-15 VITALS
SYSTOLIC BLOOD PRESSURE: 122 MMHG | DIASTOLIC BLOOD PRESSURE: 70 MMHG | WEIGHT: 263 LBS | OXYGEN SATURATION: 98 % | TEMPERATURE: 97 F | HEIGHT: 78 IN | HEART RATE: 80 BPM | BODY MASS INDEX: 30.43 KG/M2

## 2025-07-15 DIAGNOSIS — M48.061 SPINAL STENOSIS OF LUMBAR REGION, UNSPECIFIED WHETHER NEUROGENIC CLAUDICATION PRESENT: ICD-10-CM

## 2025-07-15 DIAGNOSIS — G89.29 OTHER CHRONIC PAIN: ICD-10-CM

## 2025-07-15 DIAGNOSIS — Z76.89 ENCOUNTER TO ESTABLISH CARE WITH NEW DOCTOR: Primary | ICD-10-CM

## 2025-07-15 DIAGNOSIS — F17.200 CURRENT SMOKER: ICD-10-CM

## 2025-07-15 DIAGNOSIS — M51.26 DISPLACEMENT OF LUMBAR INTERVERTEBRAL DISC WITHOUT MYELOPATHY: ICD-10-CM

## 2025-07-15 DIAGNOSIS — Z71.6 ENCOUNTER FOR TOBACCO USE CESSATION COUNSELING: ICD-10-CM

## 2025-07-15 RX ORDER — HYDROCODONE BITARTRATE AND ACETAMINOPHEN 7.5; 325 MG/1; MG/1
TABLET ORAL
Qty: 120 TABLET | Refills: 0 | Status: SHIPPED | OUTPATIENT
Start: 2025-07-30

## 2025-07-15 NOTE — PROGRESS NOTES
Spine Center Referral Navigation Encounter Note    Referred by: Rajinder Edwards MD     Imaging: MRI Lumbar Spine 5/5/25   If imaging done at an external facility, instructed patient to bring disc of MRI to appointment.     Previously Seen Spine Care Providers: None    Referred to: Jose Olmos MD in Neurosurgery     Information below is patient reported.     [employer]  Are you currently experiencing any of the following symptoms?      No    Is your condition due to an injury that occurred at work or is your care for this condition being coordinated by Worker's Compensation?      No    Are you looking for a second surgical opinion?      No    Have you had surgery on your spine (neck or back) in the last 12 months?      No    In the last several weeks, have you experienced weakness or balance issues that have caused falls or difficulty lifting your legs or feet (lower body) and/or weakness that has caused you to drop items or caused changes in handwriting (upper body)?      Lower body  [Bilateral leg weakness]   Have you had an MRI of the lumbar spine (low back) in the last year?      Yes    : Patient needs to be seen by Surgical team. Does patient require a new visit or established visit?      New patient visit    Are you closer to [place] or [facility]?      [facility]         7/16- Spoke to patient and was able to schedule for  7/30 3 pm. He prefers an appointment in late afternoon. We scheduled his appointment further out to give him time to discuss with his empolyer.

## 2025-07-28 NOTE — PROGRESS NOTES
[facility] part of [facility]  New Patient History and Physical      HPI:     Chief Complaint   Patient presents with    New Patient    Hip Pain     Over 20 years     Low Back Pain     Lower back pain over 30 years        Jordon Mendes is a 49 year old male presenting for:  Here to establish care.  Has  has a past medical history of Abnormal glucose, Allergic rhinitis, Anxiety, Asthma (HCC), At high risk for altered glucose metabolism, Back pain, High cholesterol, Low blood pressure, Low HDL (under 40), Myocardial infarction (HCC) (2023), and Sinusitis.      Former patient of Dr. Beatty.  Per chart review history of coronary artery disease, however, had a coronary angiogram done in 2023 from the radial approach with no abnormal findings noted.  Prior to this had an abnormal stress test.  History of hyperlipidemia.  Current smoker.  Anxiety takes alprazolam as needed.  Next line  Chronic pain on Norco.  Was prescribed Norco 7.5-3 25 by previous provider.  120 tabs given monthly.  Underlying chronic back pain.        Labs:   Complete Metabolic Panel:  Lab Results   Component Value Date/Time     03/04/2025 11:28 AM    K 4.4 03/04/2025 11:28 AM     03/04/2025 11:28 AM    CO2 28.0 03/04/2025 11:28 AM    CREATSERUM 0.89 03/04/2025 11:28 AM    CA 9.6 03/04/2025 11:28 AM    GLU 93 03/04/2025 11:28 AM    TP 7.4 03/04/2025 11:28 AM    ALB 4.7 03/04/2025 11:28 AM    ALKPHO 86 03/04/2025 11:28 AM    AST 18 03/04/2025 11:28 AM    ALT 13 03/04/2025 11:28 AM    BILT 0.7 03/04/2025 11:28 AM    TSH 1.296 03/04/2025 11:28 AM    T4F 1.1 03/04/2025 11:28 AM        CBC:  Lab Results   Component Value Date    WBC 7.7 03/04/2025    HGB 13.9 03/04/2025    HCT 40.6 03/04/2025    .0 03/04/2025    NEPERCENT 70.8 03/04/2025    LYPERCENT 15.7 03/04/2025    MOPERCENT 10.4 03/04/2025    EOPERCENT 1.6 03/04/2025    BAPERCENT 1.2 03/04/2025    NE 5.47 03/04/2025    LYMABS 1.21 03/04/2025    MOABSO 0.80  03/04/2025    EOABSO 0.12 03/04/2025    BAABSO 0.09 03/04/2025            Hemoglobin A1C, Microalbumin  Lab Results   Component Value Date/Time    A1C 5.7 (H) 03/04/2025 11:28 AM        Lipid panel  Lab Results   Component Value Date/Time    CHOLEST 162 03/04/2025 11:28 AM    HDL 34 (L) 03/04/2025 11:28 AM    TRIG 94 03/04/2025 11:28 AM     (H) 03/04/2025 11:28 AM    NONHDLC 128 03/04/2025 11:28 AM     The ASCVD Risk score (J Luis JOHNSON, et al., 2019) failed to calculate for the following reasons:    Risk score cannot be calculated because patient has a medical history suggesting prior/existing ASCVD       Medications:  Current Medications[1]   PMH:  Past Medical History[2]      PSH:  Past Surgical History[3]    Allergies:  Allergies[4]   Social History:  Short Social Hx on File[5]     Family History:  Family History[6]       REVIEW OF SYSTEMS:   Review of Systems   Constitutional:  Negative for chills, fatigue, fever and unexpected weight change.   HENT:  Negative for congestion, ear pain, hearing loss, rhinorrhea, sinus pain and sore throat.    Eyes:  Negative for pain, redness and visual disturbance.   Respiratory:  Negative for apnea, cough, chest tightness, shortness of breath and wheezing.    Cardiovascular:  Negative for chest pain, palpitations and leg swelling.   Gastrointestinal:  Negative for abdominal distention, abdominal pain, blood in stool, constipation and nausea.   Endocrine: Negative for cold intolerance, heat intolerance and polyuria.   Genitourinary:  Negative for dysuria, hematuria and urgency.   Musculoskeletal:  Positive for back pain. Negative for arthralgias, gait problem, joint swelling, myalgias and neck pain.   Skin:  Negative for rash and wound.   Allergic/Immunologic: Negative for food allergies and immunocompromised state.   Neurological:  Negative for dizziness, seizures, facial asymmetry, speech difficulty, weakness, light-headedness, numbness and headaches.   Hematological:   Negative for adenopathy. Does not bruise/bleed easily.   Psychiatric/Behavioral:  Negative for behavioral problems, sleep disturbance and suicidal ideas. The patient is not nervous/anxious.             PHYSICAL EXAM:   /70   Pulse 80   Temp 97.1 °F (36.2 °C)   Ht 6' 9\" (2.057 m)   Wt 263 lb (119.3 kg)   SpO2 98%   BMI 28.18 kg/m²  Estimated body mass index is 28.18 kg/m² as calculated from the following:    Height as of this encounter: 6' 9\" (2.057 m).    Weight as of this encounter: 263 lb (119.3 kg).     Wt Readings from Last 3 Encounters:   07/15/25 263 lb (119.3 kg)   03/04/25 265 lb (120.2 kg)   09/10/24 266 lb (120.7 kg)       Physical Exam  Vitals reviewed.   Constitutional:       General: He is not in acute distress.     Appearance: He is well-developed.   HENT:      Head: Normocephalic and atraumatic.   Eyes:      Conjunctiva/sclera: Conjunctivae normal.      Pupils: Pupils are equal, round, and reactive to light.   Neck:      Thyroid: No thyromegaly.   Cardiovascular:      Rate and Rhythm: Normal rate and regular rhythm.      Heart sounds: Normal heart sounds, S1 normal and S2 normal. No murmur heard.     No friction rub. No gallop.   Pulmonary:      Effort: Pulmonary effort is normal. No respiratory distress.      Breath sounds: Normal breath sounds. No wheezing or rales.   Chest:      Chest wall: No tenderness.   Abdominal:      General: Bowel sounds are normal. There is no distension.      Palpations: Abdomen is soft. There is no mass.      Tenderness: There is no abdominal tenderness. There is no guarding or rebound.   Musculoskeletal:         General: No tenderness. Normal range of motion.      Cervical back: Normal range of motion.   Lymphadenopathy:      Cervical: No cervical adenopathy.   Skin:     General: Skin is warm.      Findings: No erythema or rash.   Neurological:      Mental Status: He is alert and oriented to person, place, and time.      Cranial Nerves: No cranial nerve  deficit.      Deep Tendon Reflexes: Reflexes are normal and symmetric.   Psychiatric:         Behavior: Behavior normal.         Thought Content: Thought content normal.         Judgment: Judgment normal.             ASSESSMENT AND PLAN:   Patient is a 49 year old male who presents primarily presents for:    (Z76.89) Encounter to establish care with new doctor  (primary encounter diagnosis)  Extensive time spent reviewing history including past medical history, social history, family history, surgical hx, allergies.  Medication list reviewed.  Past diagnostic analysis and imaging reviewed if present.        (M48.061) Spinal stenosis of lumbar region, unspecified whether neurogenic claudication present  Plan: SPINE CENTER CENTRAL REFERRAL FOR NAVIGATION          Discussed referral to spine center for further evaluation.      (F17.200) Current smoker  Plan: Current smoker.  Discussed health risks associated with smoking including lung cancer, respiratory problems and cardiovascular disease.  Options and recommendations to assist with smoking cessation discussed.  Provided information on resources to assist with cessation including pharmacotherapy (Chantix or Wellbutrin), Nicotine replacement (Patches, gum, lozenges), counseling services, support groups, and helplines.  I offered ongoing support and close follow up to further assist with cessation goal.      10 minutes spent on smoking cessation.        (G89.29) Other chronic pain  Plan: Drug Abuse Panel 10 screen [E]        Today, I Rajinder Edwards MD reviewed the Patient Provider Pain Agreement (PPPA) with Jordon Mendes on 07/28/25. We discussed the goals of chronic pain management, risks of chronic opioid therapy, and conditions for continued treatment.    Pain Agreement      (M51.26) Displacement of lumbar intervertebral disc without myelopathy  Plan: HYDROcodone-acetaminophen 7.5-325 MG Oral Tab            (Z71.6) Encounter for tobacco use cessation  counseling  Plan: Smoking Cessation 3-10 minutes (41032)                    Health Maintenance:  Health Maintenance   Topic Date Due    Colorectal Cancer Screening  Never done    Asthma Control Test  Never done    DTaP,Tdap,and Td Vaccines (1 - Tdap) Never done    COVID-19 Vaccine (2 - 2024-25 season) 09/01/2024    Tobacco Cessation Counseling  Never done    Zoster Vaccines (1 of 2) 07/30/2025    Influenza Vaccine (1) 10/01/2025    Annual Physical  03/04/2026    Annual Depression Screening  Completed    Pneumococcal Vaccine: Birth to 50yrs  Completed    Meningococcal B Vaccine  Aged Out             Meds & Refills for this Visit:  Requested Prescriptions     Signed Prescriptions Disp Refills    HYDROcodone-acetaminophen 7.5-325 MG Oral Tab 120 tablet 0     Sig: Take one every 6 to 8 hours as needed for pain not to exceed 5 tablets in 24 hours       Orders Placed This Encounter   Procedures    Drug Abuse Panel 10 screen [E]       Imaging & Consults:  SPINE CENTER CENTRAL REFERRAL FOR NAVIGATION        Rajinder Edwards MD     No follow-ups on file.  Important issues to follow up on next visit      Patient indicates understanding of the above recommendations and agrees to the above plan.  Reasurrance and education provided. All questions answered.  Notified to call with any questions, complications, allergies, or worsening or changing symptoms as well as any side effects or complications from the treatments .  Red flags/ ER precautions discussed.    This note was produced using voice recognition software.  As a result, errors may occur.  When identified, these errors have been corrected.  While every attempt is made to correct errors during dictation, errors may still exist.    Total time spent was 65 minutes which includes: Preparation to see patient including chart review, reviewing appropriate medical history, counseling and education (diet and exercise), discussing treatment options, ordering appropriate diagnostic  tests and documentation.    As part of our commitment to providing you with comprehensive, transparent, and timely access to your health information, we adhere to the guidelines set forth by the 21st Century Cures Act. This Act enhances your rights to access your electronic health information and ensures that you can easily obtain your medical records.  Please note that the verbage used in this note is intended for medical documentation and communication and may be interpreted as forthright.  Please do not hesitate to contact my office if you have any questions.        Rajidner Edwards MD  Internal Medicine/Primary Care  [facility]                   [1]   Current Outpatient Medications   Medication Sig Dispense Refill    [START ON 7/30/2025] HYDROcodone-acetaminophen 7.5-325 MG Oral Tab Take one every 6 to 8 hours as needed for pain not to exceed 5 tablets in 24 hours 120 tablet 0    allopurinol 100 MG Oral Tab Take 1 tablet (100 mg total) by mouth 2 (two) times daily. 180 tablet 3    triamcinolone 0.1 % External Ointment APPLY TOPICALLY TO THE AFFECTED AREA TWICE DAILY AS NEEDED 80 g 1    ALPRAZolam 0.5 MG Oral Tab Take 1 tablet (0.5 mg total) by mouth 2 (two) times daily as needed for Anxiety. 45 tablet 2    PEG 3350-KCl-Na Bicarb-NaCl (TRILYTE) 420 g Oral Recon Soln Take prep as directed by gastro office. May substitute with Trilyte/generic equivalent if needed. 1 each 0    albuterol 108 (90 Base) MCG/ACT Inhalation Aero Soln Inhale 2 puffs into the lungs every 4 (four) hours as needed for Wheezing. 17 g 11    azelastine 0.1 % Nasal Solution 2 sprays by Nasal route 2 (two) times daily. 30 mL 3    fluticasone propionate 50 MCG/ACT Nasal Suspension 2 sprays by Nasal route 2 (two) times daily. 16 g 3    melatonin 3 MG Oral Tab Take 1 tablet (3 mg total) by mouth nightly.      valACYclovir HCl 1 G Oral Tab Take 1 tablet (1,000 mg total) by mouth every 12 (twelve) hours. 21 tablet 3    clarithromycin 500 MG Oral Tab Take 1  tablet (500 mg total) by mouth 2 (two) times daily. (Patient not taking: Reported on 3/10/2025) 20 tablet 0    Homeopathic Products (EAR PAIN RELIEF HOMEOPATHIC OT) Place in ear(s). (Patient not taking: Reported on 3/10/2025)      SILDENAFIL CITRATE 100 MG Oral Tab TAKE 1 TABLET BY MOUTH AS NEEDED FOR ERECTILE DYSFUNCTION (Patient not taking: Reported on 3/10/2025) 24 tablet 3   [2]   Past Medical History:   Abnormal glucose    High glucose    Allergic rhinitis    Anxiety    Asthma (HCC)    At high risk for altered glucose metabolism    Back pain    Spinal decompression    High cholesterol    Low blood pressure    Low HDL (under 40)    Myocardial infarction (HCC)    Had high troppin due to anxiety    Sinusitis   [3]   Past Surgical History:  Procedure Laterality Date    Excis lesn,palate/uvula  2013    Procedure: EXCISIONAL BIOPSY MOUTH;  Surgeon: Abdias Bergeron MD;  [place] [facility] [place] [facility]   [4]   Allergies  Allergen Reactions    Kiwi Extract Tightness in Throat and SWELLING     Flushing in cheeks    Flushing in cheeks   Flushing in cheeks    Penicillins FEVER, ITCHING and HIVES   [5]   Social History  Socioeconomic History    Marital status:    Tobacco Use    Smoking status: Some Days     Current packs/day: 0.00     Average packs/day: 1 pack/day for 10.0 years (10.0 ttl pk-yrs)     Types: Cigarettes     Start date: 10/1/2013     Last attempt to quit: 10/1/2023     Years since quittin.8    Smokeless tobacco: Never   Substance and Sexual Activity    Alcohol use: Not Currently    Drug use: Yes     Frequency: 1.0 times per week     Types: Cannabis, Hydrocodone     Comment: Use of the above for pain   Other Topics Concern    Caffeine Concern Yes     Comment: Coffee use    Stress Concern No    Weight Concern No    Special Diet No    Exercise Yes    Seat Belt Yes     Social Drivers of Health     Food Insecurity: No Food Insecurity (10/9/2023)    Received from [facility]  Insecurity     RETIRE How often in the past 12 months would you say you are worried or stressed about having enough money to buy nutritious meals? : Never   Transportation Needs: No Transportation Needs (10/9/2023)    [facility] from [facility]    PRAPARTHIERNO - Transportation     In the past 12 months, has lack of transportation kept you from medical appointments or from getting medications?: No     In the past 12 months, has lack of transportation kept you from meetings, work, or from getting things needed for daily living?: No   [6]   Family History  Problem Relation Age of Onset    Dementia Mother     Stroke Mother     Cancer Father

## 2025-07-30 ENCOUNTER — OFFICE VISIT (OUTPATIENT)
Dept: SURGERY | Facility: CLINIC | Age: 50
End: 2025-07-30
Payer: COMMERCIAL

## 2025-07-30 VITALS
SYSTOLIC BLOOD PRESSURE: 116 MMHG | DIASTOLIC BLOOD PRESSURE: 75 MMHG | HEIGHT: 78 IN | WEIGHT: 263 LBS | BODY MASS INDEX: 30.43 KG/M2 | HEART RATE: 70 BPM

## 2025-07-30 DIAGNOSIS — M54.9 MUSCULOSKELETAL BACK PAIN: ICD-10-CM

## 2025-07-30 DIAGNOSIS — M51.16 LUMBAR DISC HERNIATION WITH RADICULOPATHY: ICD-10-CM

## 2025-07-30 DIAGNOSIS — M48.062 LUMBAR STENOSIS WITH NEUROGENIC CLAUDICATION: ICD-10-CM

## 2025-07-30 DIAGNOSIS — R29.898 WEAKNESS OF BOTH LOWER EXTREMITIES: ICD-10-CM

## 2025-07-30 DIAGNOSIS — M47.816 LUMBAR SPONDYLOSIS: ICD-10-CM

## 2025-07-30 DIAGNOSIS — R29.2 HYPERREFLEXIA: Primary | ICD-10-CM

## 2025-07-30 DIAGNOSIS — M53.3 SACROILIAC PAIN: ICD-10-CM

## 2025-07-30 DIAGNOSIS — R26.81 UNSTEADY GAIT: ICD-10-CM

## 2025-07-30 DIAGNOSIS — M54.16 LUMBAR RADICULOPATHY: ICD-10-CM

## 2025-07-30 PROCEDURE — 99205 OFFICE O/P NEW HI 60 MIN: CPT | Performed by: STUDENT IN AN ORGANIZED HEALTH CARE EDUCATION/TRAINING PROGRAM

## 2025-08-05 ENCOUNTER — TELEPHONE (OUTPATIENT)
Dept: FAMILY MEDICINE CLINIC | Facility: CLINIC | Age: 50
End: 2025-08-05

## 2025-08-05 DIAGNOSIS — F41.9 ANXIETY: ICD-10-CM

## 2025-08-05 RX ORDER — ALPRAZOLAM 0.5 MG
0.5 TABLET ORAL 2 TIMES DAILY PRN
Qty: 45 TABLET | Refills: 0 | Status: CANCELLED | OUTPATIENT
Start: 2025-08-05

## 2025-08-14 ENCOUNTER — TELEPHONE (OUTPATIENT)
Age: 50
End: 2025-08-14

## 2025-08-15 ENCOUNTER — TELEPHONE (OUTPATIENT)
Age: 50
End: 2025-08-15

## 2025-08-18 PROBLEM — L30.9 ECZEMA: Status: ACTIVE | Noted: 2025-08-18

## 2025-08-18 PROBLEM — H65.02 ACUTE SEROUS OTITIS MEDIA OF LEFT EAR: Status: RESOLVED | Noted: 2024-08-08 | Resolved: 2025-08-18

## 2025-08-18 PROBLEM — J45.909 ASTHMA (HCC): Status: ACTIVE | Noted: 2025-08-18

## 2025-08-18 PROBLEM — J34.2 DEVIATED SEPTUM: Status: ACTIVE | Noted: 2025-08-18

## 2025-08-18 PROBLEM — R07.89 ATYPICAL CHEST PAIN: Status: RESOLVED | Noted: 2023-10-12 | Resolved: 2025-08-18

## 2025-08-18 PROBLEM — Z00.00 ANNUAL PHYSICAL EXAM: Status: RESOLVED | Noted: 2017-10-09 | Resolved: 2025-08-18

## 2025-08-18 PROBLEM — I10 ESSENTIAL HYPERTENSION, BENIGN: Status: RESOLVED | Noted: 2018-04-09 | Resolved: 2025-08-18

## 2025-08-18 PROBLEM — Z12.11 COLON CANCER SCREENING: Status: RESOLVED | Noted: 2021-07-01 | Resolved: 2025-08-18

## 2025-08-25 DIAGNOSIS — M51.26 DISPLACEMENT OF LUMBAR INTERVERTEBRAL DISC WITHOUT MYELOPATHY: ICD-10-CM

## 2025-08-29 RX ORDER — HYDROCODONE BITARTRATE AND ACETAMINOPHEN 7.5; 325 MG/1; MG/1
TABLET ORAL
Qty: 120 TABLET | Refills: 0 | Status: SHIPPED | OUTPATIENT
Start: 2025-08-29

## (undated) DIAGNOSIS — M51.26 DISPLACEMENT OF LUMBAR INTERVERTEBRAL DISC WITHOUT MYELOPATHY: ICD-10-CM

## (undated) DEVICE — KIT PRSS MNTR 60IN 12IN 3W STPCK TRNDCR FLUSH DEV MACDRP

## (undated) DEVICE — GLOVE SURG 5.5 PROTEXIS LF CRM PF BEAD CUFF STRL PLISPRN

## (undated) DEVICE — DRAPE ANGIO BRACHIAL 38.5X60IN

## (undated) DEVICE — COVER PROBE FLX-FEEL 48X6IN OR 4 FLAG 2 SHT 24 PRINT STRL LF

## (undated) DEVICE — Device

## (undated) DEVICE — TOWEL OR BLU 16X26IN 4PK

## (undated) DEVICE — INTRODUCER SHTH 6FR 50CM 12CM GW HEMOSTASIS VLV SDPRT

## (undated) DEVICE — CATHETER 6FR PGTL FL4 FR4 CRV 100110CM FULL LGTH WIRE BRAID

## (undated) DEVICE — CATHETER 6FR FR3.5 CRV 100CM FULL LGTH WIRE BRAID ROBUST

## (undated) DEVICE — GUIDEWIRE GUIDERIGHT 5CM 150CM 3MM RDS STD J CRV .035IN VASC

## (undated) DEVICE — GUIDEWIRE GUIDERIGHT 5CM 260CM 3MM RDS STD J CRV EXCH .35IN

## (undated) DEVICE — KIT ANGIO LH NAMIC DISP STRL LF MTS

## (undated) DEVICE — GEL ELECTRODE PRO-PADZ RDLCNT E M R SERIES DFBR

## (undated) DEVICE — SET ACC 40CM 4FR 21GA MPNCH .018IN NTNL PLATINUM STIFFEN

## (undated) DEVICE — DRESSING TRANS 4.75X4IN ADH HPOAL WTPRF TEGADERM PU STD STRL

## (undated) DEVICE — KIT INTRO 6FR 21GA 10CM 45CM .021IN 35MM FLEX STRGT SHTH DIL

## (undated) DEVICE — DEVICE CMPR 24CM TR BAND REG RADL ART 2 BLN TRANS ADJ STRAP

## (undated) DEVICE — SHIELD RADPAD ORANGE  FEMORAL ENTRY ANGIO

## (undated) DEVICE — GLOVE SURG 7.5 PROTEXIS LF CRM PF SMTH BEAD CUFF STRL

## (undated) NOTE — LETTER
January 6, 2022    Patient: Gregorio Ray   YOB: 1975     Dear Employer,  At [facility], we are taking special precautions and doing everything we can to prevent the spread of COVID-19 (coronavirus disease 2019).  During thi or medication, chronic respiratory or heart conditions and diabetes).   • During the social distancing period imposed by the [place] in March, any employee who can be isolated at home and avoid exposure to the virus from colleagues would be at [employer]

## (undated) NOTE — LETTER
Patient Name: Vivian Cohen  : 1975  MRN: [id number]  Patient Address: [address]      Coronavirus Disease 2019 (COVID-19)     [facility] is committed to the safety and well-being of our patients, cassie carefully. If your symptoms get worse, call your healthcare provider immediately. 3. Get rest and stay hydrated.    4. If you have a medical appointment, call the healthcare provider ahead of time and tell them that you have or may have COVID-19.  5. For m of fever-reducing medications; and  · Improvement in respiratory symptoms (e.g., cough, shortness of breath); and  · At least 10 days have passed since symptoms first appeared OR if asymptomatic patient or date of symptom onset is unclear then use 10 days donors must:    · Have had a confirmed diagnosis of COVID-19  · Be symptom-free for at least 14 days*    *Some people will be required to have a repeat COVID-19 test in order to be eligible to donate.  If you’re instructed by [employer] that a repeat test is r random. Researchers are trying to identify similarities between people with a Post-COVID condition to better understand if there are risk factors. How do I prevent a Post-COVID condition?   The best way to prevent the long-term symptoms of COVID-19 is

## (undated) NOTE — LETTER
2022      [facility] [address]      Dear Jag Dose:    Your TB test today was {DMG_POSITIVE_NE}.     Date given: ***  Date read: 2022    Also, did you know that you can receive test result inf

## (undated) NOTE — LETTER
1/6/2022              Jamee Powell        [id number] [facility] [place] [address]         To Whom It May Concern :    My patient Mr. Yeny Harvey tested positive for covid on Jan 5, 2021.   According to the CDC guidelines he will need to b

## (undated) NOTE — LETTER
2022      [facility] [address]      Dear Dayday Hernandez:    Your TB test today was {DMG_POSITIVE_NE}.     Date given: ***  Date read: 2022    Also, did you know that you can receive test result inf

## (undated) NOTE — LETTER
ASTHMA ACTION PLAN for Ami Drought     : 1975     Date: 22  Doctor:  Ernesto Perry MD  Phone for doctor or clinic: Kim Cabrera [address]  [phone]           ACT Goal: 20 o 3. Red - Stop!  Danger! <50% Personal Best Peak Flow  Continue Controller Medications But ADD:   Medicine not helping  Breathing is hard and fast  Nose opens wide  Can't walk  Ribs show  Can't talk well Medicine How much to take When to gold

## (undated) NOTE — LETTER
January 6, 2022    Patient: Malika Gray   YOB: 1975     Dear Employer,  At [place] [address], we are taking special precautions and doing everything we can to prevent the spread of COVID-19 (coronavirus disease 2019).  During thi or medication, chronic respiratory or heart conditions and diabetes).   • During the social distancing period imposed by the [place] in March, any employee who can be isolated at home and avoid exposure to the virus from colleagues would be at [employer]

## (undated) NOTE — MR AVS SNAPSHOT
[facility] [place] [address] [id number]  [phone]               Thank you for choosing us for your health care visit with Freddie Potts MD.  We are glad to serve you and happy to provide you with this summary of y naproxen 500 MG Tabs   TAKE 1 TABLET TWICE A DAY WITH FOOD   Commonly known as:  NAPROSYN           triamcinolone acetonide 0.1 % Oint   APPLY BID PRN TO AFFECTED AREA   Commonly known as:  KENALOG                Where to Get Your Medications      These m Eat plenty of low-fat dairy products High fat meats and dairy   Choose whole grain products Foods high in sodium   Water is best for hydration Fast food.    Eat at home when possible     Tips for increasing your physical activity – Adults who are physically